# Patient Record
Sex: MALE | Race: WHITE | NOT HISPANIC OR LATINO | Employment: FULL TIME | URBAN - METROPOLITAN AREA
[De-identification: names, ages, dates, MRNs, and addresses within clinical notes are randomized per-mention and may not be internally consistent; named-entity substitution may affect disease eponyms.]

---

## 2018-01-16 ASSESSMENT — ENCOUNTER SYMPTOMS: SLEEP DISTURBANCE: 0

## 2018-01-19 ENCOUNTER — SLEEP CENTER VISIT (OUTPATIENT)
Dept: SLEEP MEDICINE | Facility: MEDICAL CENTER | Age: 43
End: 2018-01-19
Payer: COMMERCIAL

## 2018-01-19 VITALS
RESPIRATION RATE: 16 BRPM | OXYGEN SATURATION: 93 % | DIASTOLIC BLOOD PRESSURE: 78 MMHG | WEIGHT: 315 LBS | HEIGHT: 75 IN | HEART RATE: 71 BPM | BODY MASS INDEX: 39.17 KG/M2 | SYSTOLIC BLOOD PRESSURE: 140 MMHG

## 2018-01-19 DIAGNOSIS — G47.33 OSA (OBSTRUCTIVE SLEEP APNEA): ICD-10-CM

## 2018-01-19 PROCEDURE — 99203 OFFICE O/P NEW LOW 30 MIN: CPT | Performed by: INTERNAL MEDICINE

## 2018-01-19 NOTE — PROGRESS NOTES
"Chief Complaint   Patient presents with   • New Patient     Sleep consult       HPI: This patient is a 42 y.o. Male who is self-referred for sleep apnea evaluation. He has \"always snored and been tired\", and had a screening home test many years ago which allegedly showed sleep apnea however did not pursue treatment at that time. He states that he is a mouth breather, and has always had difficulty breathing through his nose. In terms of sleep habits, he goes to bed by 9:30 PM, falling asleep within 20 minutes. He himself is unaware of snoring or apneas although these are witnessed by his wife. He generally sleeps through the night. He gets up at 5 AM, feeling tired. He will typically nap in the day. He drinks 3-4 cups of coffee daily, denies tobacco, alcohol or recreational drug use. He is on no medications. His BMI is 44.       Past Medical History:   Diagnosis Date   • Back pain    • Chickenpox    • Kidney stone    • Obesity    • Pain     knee       Social History     Social History   • Marital status:      Spouse name: N/A   • Number of children: N/A   • Years of education: N/A     Occupational History   • Not on file.     Social History Main Topics   • Smoking status: Never Smoker   • Smokeless tobacco: Never Used   • Alcohol use No   • Drug use: No   • Sexual activity: Not on file     Other Topics Concern   • Not on file     Social History Narrative   • No narrative on file       Family History   Problem Relation Age of Onset   • Cancer Mother    • Alzheimer's Disease Maternal Grandmother    • Alzheimer's Disease Maternal Grandfather    • Sleep Apnea Neg Hx        No current outpatient prescriptions on file prior to visit.     No current facility-administered medications on file prior to visit.        Allergies: Patient has no known allergies.    ROS:   Constitutional: Denies fevers, chills, night sweats, fatigue or weight loss  Eyes: Denies vision loss, pain, drainage, double vision  Ears, Nose, Throat: " "Denies earache, difficulty hearing, tinnitus, +nasal congestion, denies hoarseness  Cardiovascular: Denies chest pain, tightness, palpitations, orthopnea or edema  Respiratory: Denies shortness of breath, cough, wheezing, hemoptysis  Sleep: + daytime sleepiness, snoring, apneas, denies insomnia, morning headaches  GI: Denies heartburn, dysphagia, nausea, abdominal pain, diarrhea or constipation  : Denies frequent urination, hematuria, discharge or painful urination  Musculoskeletal: Denies back pain, painful joints, sore muscles  Neurological: Denies weakness or headaches  Skin: No rashes    Blood pressure 140/78, pulse 71, resp. rate 16, height 1.905 m (6' 3\"), weight (!) 162.4 kg (358 lb), SpO2 93 %.    Physical Exam:  Appearance: Well-nourished, well-developed, in no acute distress  HEENT: Normocephalic, atraumatic, white sclera, PERRLA, oropharynx clear, Mallampati 3, 1+ tonsillar hypertrophy  Neck: No adenopathy or masses  Respiratory: no intercostal retractions or accessory muscle use  Lungs auscultation: Clear to auscultation bilaterally  Cardiovascular: Regular rate rhythm. No murmurs, rubs or gallops.  No LE edema  Abdomen: soft, nondistended  Gait: Normal  Digits: No clubbing, cyanosis  Motor: No focal deficits  Orientation: Oriented to time, person and place    Diagnosis:  1. ROYER (obstructive sleep apnea)  OVERNIGHT HOME SLEEP STUDY   2. BMI 40.0-44.9, adult (CMS-[facility])         Plan:  The patient has loud snoring, witnessed apneas, medically significant excessive weight and a crowded airway, with high clinical suspicion for obstructive sleep apnea syndrome. He has associated daytime hypersomnolence. We discussed the pathophysiology of sleep apnea as well as treatment options. He is otherwise in good health and denies any medical comorbidities or medications.  We will arrange for home polysomnography for assessment.  Return for after sleep study.      Answers for HPI/ROS submitted by the patient on " "1/16/2018   Year of your last physical exam: n/a  Results of exam: n/a  Occupation :   Height: 6' 3\"  Current weight: 350  6 months ago: 350  At age 20: 220  What is the reason for your visit today?: To test wheter I have sleep apnea  Name of person referring you to the Sleep Center: Vicente Yanez  Have you ever been hospitalized?: Yes  Reason, year, and hospital in which you were hospitalized:: Toe Surgery  Have you ever had problems with anesthesia?: No  Have you experienced post-operative delirium?: No  Any complications with surgery?: No  What year did you receive your last Flu shot?: 2017  Have you had Allergy skin testing? If so, please list the year and result:: no  Please briefly describe your sleep problem and how old you were when it began.: I've always snored and been tired in the mornings.  I have always been a mouth breather as well.  How does this affect your daily life and activities? Please also rate how serious of a problem this is (1 = Not at all, 10 = Very Serious).: 4  Have you had any previous evaluations, examinations, or treatment for this sleep problem or any other problems with your sleep? If so, please describe the evaluation, treatment, and results.: no  Have you used any medications (prescribed or otherwise) to help your sleep problem? If yes, include name, amount, frequency, and the prescribing physician.: no  If employed, what time do you usually start and end work?: 6 am  Do you ever change work shifts? If yes, describe how often (never, infrequently, regularly).: Not any longer, used to work shift work.  What time do you usually go to bed and wake up on: Weekdays? Weekends?: Go to bed 9:30pm; wake up 5:00am  Do you have a regular bed partner?: Yes  How many minutes does it usually take to fall asleep at night after turning off the lights?: 20 minutes  What do you ordinarily do just prior to turning out the lights and attempting to go to sleep (e.g., reading, TV, baths, " etc.)?: Watch TV  On average, how many times do you wake up during the night?: Depends, sometimes several times, sometimes never.  On average, how many times do you wake up to use the bathroom?: Usually never  Do you often wake up too early in the morning and are unable to return to sleep?: Occasionally  On average, how many hours of sleep do you get per night?: 8 hrs.  How do you usually awaken?  Alarm, spontaneously, or other?: Alarm  Is it difficult for you to awaken and get out of bed after sleeping? (Not at all, Sometimes, Very): Yes, most of the time.  Do you nap or return to bed after arising?: No, but will nap during the day usually.  Are you bothered by sleepiness during the day?: Yes, always tired throughout the day, usually take nap afternoon and evening when I get home.  Do you feel that you get too much sleep at night?: No  Do you feel that you get too little sleep at night?: No  Do you usually feel tired during the day? If so, what do you attribute this to?: I love naps, I usually feel very tired during the day.  I don't think I'm getting a restful night of sleep, I believe I have sleep apnea.  Do you find yourself falling asleep when you don't mean to? : Sometimes, only when I am not active, such as watching TV.  If yes, how long does your sleep episode last?: I think throughout the night, I just don't feel rested afterwards.  Do you feel rested or refreshed after the sleep episode?: No  Have you ever suddenly fallen?: No  Have you ever experienced sudden body weakness?: No  Have you ever experienced weakness or paralysis upon going to sleep?: No  Have you ever experienced weakness or paralysis upon awakening from sleep?: No, have experienced numbness in extremity such as arm.  If yes for either of the above two questions, please indicate how many times per week does this occur?: Not very often at all  Have you ever experienced seeing things or hearing voices/noises: That weren't real? On going to  "sleep? During the night? On awakening from sleep? During the day?: No  Do you have difficulty breathing at night? If yes, briefly describe.: Yes, I was getting T-Shots for a short time and I experienced breathing issues.  How many times per week?: Every night when on T-Shots.  Doesn't occur now.  How did you become aware of this, at what age did this first occur, and how many years has this occurred?: Last year was when I was on T-Shots for 3 months.  Have you been told you snore while asleep? If so, does it disturb a bed partner (or someone in the same room), or someone in the next room?: Very Badly, and yes it disturbs my wife.  Have you ever experienced doing something without being aware of the action? If yes, please describe.: No  How many times per week does this occur?: Never  Have you ever experienced upon lying in bed before sleep or on awakening from sleep: Restlessness of legs, \"nervous legs\", \"creeping crawling\" sensation of legs, or twitching of legs?: No  How many times per week does this occur, and how many minutes does the sensation last?: 0  Does anything relieve the sensations (e.g., getting out of bed, medication, massage)?: No  Have you ever been told that your arms or legs jerk or twitch while you are asleep? If yes, how many times per night does this occur?: I roll around quite a bit according to my wife.  At what age did this first occur, and how many years has this occurred?: I believe I have always done this.  Does this seem to awaken you from your sleep?: No  Do you know, or have you ever been told that you do any of the following while sleeping: talk, walk, grit teeth, wet the bed, wake up screaming or seemingly afraid, have disturbing dreams, have unusual movements, wake up with headaches, (males) have erections? If yes to any of these, please indicate how many times per week, age started, last occurrence, treatment received.: grit teeth, headaches (sometimes), talking in my sleep.  Has " anyone in your family been known to have any sleep problems? If yes, please list the type of problem (e.g., trouble getting to sleep, too sleepy, bed wetting, etc.), the relationship of this person to you, and the treatment received.: Many snore and feel lethargic in morning, no one has been treated or went in to test for sleep apnea that I know of.

## 2018-02-06 ENCOUNTER — HOME STUDY (OUTPATIENT)
Dept: SLEEP MEDICINE | Facility: MEDICAL CENTER | Age: 43
End: 2018-02-06
Attending: INTERNAL MEDICINE
Payer: COMMERCIAL

## 2018-02-06 DIAGNOSIS — G47.33 OSA (OBSTRUCTIVE SLEEP APNEA): ICD-10-CM

## 2018-02-06 PROCEDURE — 95806 SLEEP STUDY UNATT&RESP EFFT: CPT | Performed by: INTERNAL MEDICINE

## 2018-02-07 ENCOUNTER — TELEPHONE (OUTPATIENT)
Dept: SLEEP MEDICINE | Facility: MEDICAL CENTER | Age: 43
End: 2018-02-07

## 2018-02-07 DIAGNOSIS — G47.33 OSA (OBSTRUCTIVE SLEEP APNEA): ICD-10-CM

## 2018-02-07 NOTE — PROCEDURES
Interpretation:    This home sleep study was performed on 2/6/2018 using an apnea link air type III portable device. The recording duration was 7 hours and 11 minutes, the monitoring time for flow was 6 hours and 58 minutes, and the oxygen saturation evaluation duration was 6 hours and 54 minutes.    Moderate obstructive sleep apnea hypopnea was found. The respiratory event index was 23.0. Most of the events were hypopneas. The patient's central index was 0.1. The patient experienced 157 desaturations and the oxygen desaturation index was 22.7. The oxygen saturation was less than or equal to 90% for 99% of the recording. The patient spent 5 hours and 50 minutes with saturations less than or equal to 88%. The heart rate varied between a minimum of 54 bpm and a maximum of 218 bpm. The patient slept primarily in the supine position. Snoring was noted throughout the study.    Recommendation:    Recommend a positive airway pressure titration.

## 2018-02-08 NOTE — TELEPHONE ENCOUNTER
Tried to contact the patient no answer. Left voicemail for the patient to call our office to give him LUCY Moses message.

## 2018-02-08 NOTE — TELEPHONE ENCOUNTER
Please let patient know he has moderate sleep apnea and in lab titration study is recommended. If he is amenable to this, will order sleep study and hopefully have this completed prior to his next office visit. Otherwise he may wait to discuss results further at next appt.

## 2018-02-09 NOTE — TELEPHONE ENCOUNTER
Patient called back and I provided him the message from LUCY Moses. He would like to do the in lab titration study and if he can do a Friday or Saturday test that would be great so he does not miss work.

## 2018-02-12 NOTE — TELEPHONE ENCOUNTER
I called the patient to let him know that the in lab study was ordered by LUCY Lugo. He was transferred to scheduling to get that in lab study completed.

## 2018-03-24 ENCOUNTER — SLEEP STUDY (OUTPATIENT)
Dept: SLEEP MEDICINE | Facility: MEDICAL CENTER | Age: 43
End: 2018-03-24
Payer: COMMERCIAL

## 2018-03-24 DIAGNOSIS — G47.33 OSA (OBSTRUCTIVE SLEEP APNEA): ICD-10-CM

## 2018-03-24 PROCEDURE — 95811 POLYSOM 6/>YRS CPAP 4/> PARM: CPT | Performed by: FAMILY MEDICINE

## 2018-03-29 NOTE — PROCEDURES
Comments:  The patient underwent a diagnostic polysomnogram using the standard montage for measurement of parameters of sleep, respiratory events, movement abnormalities, and heart rate and rhythm.   A microphone was used to monitor snoring.  Interpretation:  Study start time was 09:26:42 PM. Diagnostic recording time was 7h 47.5m with a total sleep time of 6h 37.5m resulting in a sleep efficiency of 85.03%%.   Sleep latency from the start of the study was 13 minutes and the latency from sleep to REM was 144 minutes.  In total,55 arousals were scored for an arousal index of 8.3.  Respiratory:  There were a total of 2 apneas consisting of 0 obstructive apneas, 0 mixed apneas, and 2 central apneas. A total of 73 hypopneas were scored.  The apnea index was 0.30 per hour and the hypopnea index was 11.02 per hour resulting in an overall AHI of 11.32.  AHI during rem was 4.38 and AHI while supine was 15.08.  Oximetry:  There was a mean oxygen saturation of 91.0% with a minimum oxygen saturation of 83.0%. Time spent with oxygen saturations below 89% was 23.0 minutes.  Cardiac:  The highest heart rate seen while awake was 86 BPM while the highest heart rate during sleep was 74 BPM with an average sleeping heart rate of 53 BPM.  Limb Movements:  There were a total of 0 PLMs during sleep, of which 0 were PLMS arousals. This resulted in a PLMS index of 0.0 and a PLMS arousal index of 0.0.    Technical summary: The patient underwent a CPAP titration.  This was a 16 channel montage study to include a 6 channel EEG, a 2 channel EOG, and chin EMG, left and right leg EMG, a snore channel, and a CFLOW pressure transducer.   Respiratory effort was assessed with the use of a thoracic and abdominal monitor and overnight oximetry was obtained. Audio and video recordings were reviewed. This was a fully attended study and sleep stage scoring was performed. The test was technically adequate.    General sleep summary:  During the overnight  study, the sleep latency was 13 min which is normal. The REM latency was 142 min which is  Increased. The total sleep time was 397 min and sleep efficiency was 85 % which is normal.  Sleep stage proportions showed increased N3 sleep and increased WASO of 70 min.  In regards to sleep quality there was a mild degree of sleep fragmentation as shown by the arousal index of 8 an hour. The arousals were due to spontaneous arousal .    CPAP Titration:  The PAP titration was initiated with CPAP 5 cm of water and the pressure which was slowly titrated up in an attempt to eliminate sleep disordered breathing and snoring. The final pressure tested during the study was CPAP 16 cm water and at this final pressure the patient was observed in the supine position  and in the REM sleep stage. The apnea hypopnea index improved to 6 per hour and O2 [place] 90%. The average O2 stauration was 92%. He spent 13 % of sleep time below 89% O2 saturation. Snoring was resolved. There were no significant periodic limb movements.  The patient demonstrated NSR and an average heart rate of 53 beats per minute.  There was no ventricular ectopy or tachyarrhythmias. The patient utilized medium amaraview mask with heated humidification. The CPAP was well-tolerated and there were minimal air leaks. No supplemental oxygen was required.    Impression:  1.  ROYER  2.  Successful CPAP titration      Recommendations:  I recommend CPAP 16 cm with medium amraview mask. Recommended 30 day compliance download to assess the efficacy of the recommended pressure and compliance for further outpatient monitoring and management of CPAP therapy. In some cases alternative treatment options may prove effective in resolving sleep apnea and these options include upper airway surgery, the use of a dental orthotic or weight loss and positional therapy. Clinical correlation is required. In general patients with sleep apnea are advised to avoid alcohol and sedatives and to not  operate a motor vehicle while drowsy and are at a greater risk for cardiovascular disease.

## 2018-04-23 ENCOUNTER — SLEEP CENTER VISIT (OUTPATIENT)
Dept: SLEEP MEDICINE | Facility: MEDICAL CENTER | Age: 43
End: 2018-04-23
Payer: COMMERCIAL

## 2018-04-23 VITALS
HEIGHT: 75 IN | RESPIRATION RATE: 15 BRPM | HEART RATE: 96 BPM | BODY MASS INDEX: 39.17 KG/M2 | DIASTOLIC BLOOD PRESSURE: 82 MMHG | SYSTOLIC BLOOD PRESSURE: 132 MMHG | OXYGEN SATURATION: 93 % | WEIGHT: 315 LBS

## 2018-04-23 DIAGNOSIS — G47.33 OSA (OBSTRUCTIVE SLEEP APNEA): ICD-10-CM

## 2018-04-23 DIAGNOSIS — J30.2 SEASONAL ALLERGIC RHINITIS, UNSPECIFIED CHRONICITY, UNSPECIFIED TRIGGER: ICD-10-CM

## 2018-04-23 PROCEDURE — 99213 OFFICE O/P EST LOW 20 MIN: CPT | Performed by: NURSE PRACTITIONER

## 2018-04-23 NOTE — PROGRESS NOTES
Chief Complaint   Patient presents with   • Follow-Up     SS Results        HPI:  Luc Haynes is a 42 y.o. year old male here today for follow-up on his dedicated CPAP titration study. He was self-referred for sleep apnea evaluation. He has a history of snoring and daytime fatigue for many years. His BMI is 44. He has a home sleep study 2/6/2018 which indicates evidence of moderately severe obstructive sleep apnea with an AHI of 23 with a low 02 saturation of 80%. He underwent an in lab dedicated titration study 3/24/2018 which indicates positive response to CPAP. On the final CPAP pressure of 15 his AHI was reduced to 6.4 with a low 02 of 90%. He did have REM sleep on this pressure.   He does have a history of seasonal allergic rhinitis. He uses an OTC antihistamine prn.     Past Medical History:   Diagnosis Date   • Back pain    • Chickenpox    • Kidney stone    • Obesity    • Pain     knee       Past Surgical History:   Procedure Laterality Date   • KNEE ARTHROSCOPY  4/9/2009    Performed by SYLVIA GONZALEZ at [facility]   • MEDIAL MENISCECTOMY  4/9/2009    Performed by SYLVIA GONZALEZ at [facility]   • OTHER SURGICAL PROCEDURE  1981    right great toe replacement with skin graft from left calf       Family History   Problem Relation Age of Onset   • Cancer Mother    • Alzheimer's Disease Maternal Grandmother    • Alzheimer's Disease Maternal Grandfather    • Sleep Apnea Neg Hx        Social History     Social History   • Marital status:      Spouse name: N/A   • Number of children: N/A   • Years of education: N/A     Occupational History   • Not on file.     Social History Main Topics   • Smoking status: Never Smoker   • Smokeless tobacco: Never Used   • Alcohol use No   • Drug use: No   • Sexual activity: Not on file     Other Topics Concern   • Not on file     Social History Narrative   • No narrative on file       ROS:  Constitutional: Denies fevers, chills,  "sweats, weight loss  Eyes: Denies glasses, vision loss, pain, drainage, double vision  Ears/Nose/Mouth/Throat: Denies ear ache, difficulty hearing, sore throat, persistent hoarseness, decayed teeth/toothache. Positive for rhinitis   Cardiovascular: Denies chest pain, tightness, palpitations, swelling in feet/legs, fainting, difficulty breathing when laying down  Respiratory: Denies shortness of breath, cough, sputum, wheezing, painful breathing, coughing up blood  GI: Denies heartburn, difficulty swallowing, nausea, vomiting, abdominal pain, diarrhea, constipation  : Denies frequent urination, painful urination  Integumentary: Denies rashes, lumps or color changes  MSK: Denies painful joints, sore muscles, and back pain.   Neurological: Denies frequent headaches, dizziness, weakness  Sleep: See HPI       No current outpatient prescriptions on file.     No current facility-administered medications for this visit.        No Known Allergies    Blood pressure 132/82, pulse 96, resp. rate 15, height 1.905 m (6' 3\"), weight (!) 160.6 kg (354 lb), SpO2 93 %.    PE:   Appearance: Well developed, well nourished, no acute distress  Eyes: PERRL, EOM intact, sclera white, conjunctiva moist  Ears: no lesions or deformities  Hearing: grossly intact  Nose: no lesions or deformities  Oropharynx: tongue normal, posterior pharynx without erythema or exudate  Mallampati Classification: Class 2  Neck: supple, trachea midline, no masses   Respiratory effort: no intercostal retractions or use of accessory muscles  Lung auscultation: no rales, rhonchi or wheezes  Heart auscultation: no murmur rub or gallop  Extremities: no cyanosis or edema  Abdomen: soft ,non tender, no masses  Gait and Station: normal  Digits and nails: no clubbing, cyanosis, petechiae or nodes.  Cranial nerves: grossly intact  Skin: no rashes, lesions or ulcers noted  Orientation: Oriented to time, person and place  Mood and affect: mood and affect appropriate, " normal interaction with examiner  Judgement: Intact          Assessment:  1. ROYER (obstructive sleep apnea)  DME CPAP   2. BMI 40.0-44.9, adult (CMS-[facility])  Patient identified as having weight management issue.  Appropriate orders and counseling given.   3. Seasonal allergic rhinitis, unspecified chronicity, unspecified trigger           Plan:      1) Reviewed home sleep study and recent titration study in detail. Discussed pathophysiology of sleep apnea and various treatment options in detail. He is amendable to a trial of airway pressurization. Order for CPAP at 16 CM H20. Handout provided on sleep apnea.  2) Sleep hygiene discussed.   3) Weight loss recommended.   4) Continue prn OTC antihistamine. Recommend prn OTC nasal steroid.   5) Follow up in 8 weeks with compliance card download, sooner if needed.

## 2018-08-13 ENCOUNTER — SLEEP CENTER VISIT (OUTPATIENT)
Dept: SLEEP MEDICINE | Facility: MEDICAL CENTER | Age: 43
End: 2018-08-13
Payer: COMMERCIAL

## 2018-08-13 VITALS
DIASTOLIC BLOOD PRESSURE: 66 MMHG | OXYGEN SATURATION: 90 % | SYSTOLIC BLOOD PRESSURE: 124 MMHG | HEIGHT: 75 IN | HEART RATE: 78 BPM | WEIGHT: 315 LBS | BODY MASS INDEX: 39.17 KG/M2 | RESPIRATION RATE: 18 BRPM

## 2018-08-13 DIAGNOSIS — J30.2 SEASONAL ALLERGIC RHINITIS, UNSPECIFIED TRIGGER: ICD-10-CM

## 2018-08-13 DIAGNOSIS — G47.33 OSA (OBSTRUCTIVE SLEEP APNEA): ICD-10-CM

## 2018-08-13 PROCEDURE — 99213 OFFICE O/P EST LOW 20 MIN: CPT | Performed by: NURSE PRACTITIONER

## 2018-08-13 NOTE — PROGRESS NOTES
Chief Complaint   Patient presents with   • Follow-Up     ROYER, compliance check       HPI:  Luc Haynes is a 42 y.o. year old male here today for follow-up on his obstructive sleep apnea. He had a history of snoring and daytime fatigue for many years. He had a home sleep study 2/6/2018 which indicated evidence of moderately severe obstructive sleep apnea with an AHI of 23 with a low 02 saturation of 80%. He underwent an in lab dedicated titration study 3/24/2018 which indicates positive response to CPAP. On the final CPAP pressure of 15 his AHI was reduced to 6.4 with a low 02 of 90%. He did have REM sleep on this pressure. He was started on CPAP at 16 CM H20 at his last office visit. His compliance card download today in the office indicates an AHI of 0.8 with an average use of over 6 hours at night.   He has a full face mask which is comfortable. He feels his current mask is a good fit. He tolerates the pressure well. He notes significant dry mouth in the mornings. He does breath through his mouth. He does feel he sleeps a little better on therapy and energy levels have improved. He denies any morning headaches.   He does have a history of seasonal allergic rhinitis. He uses an OTC antihistamine prn.     Past Medical History:   Diagnosis Date   • Back pain    • Chickenpox    • Kidney stone    • Obesity    • Pain     knee       Past Surgical History:   Procedure Laterality Date   • KNEE ARTHROSCOPY  4/9/2009    Performed by SYLVIA GONZALEZ at SURGERY [facility]   • MEDIAL MENISCECTOMY  4/9/2009    Performed by SYLVIA GONZALEZ at SURGERY [facility]   • OTHER SURGICAL PROCEDURE  1981    right great toe replacement with skin graft from left calf       Family History   Problem Relation Age of Onset   • Cancer Mother    • Alzheimer's Disease Maternal Grandmother    • Alzheimer's Disease Maternal Grandfather    • Sleep Apnea Neg Hx        Social History     Social History   • Marital status:  "     Spouse name: N/A   • Number of children: N/A   • Years of education: N/A     Occupational History   • Not on file.     Social History Main Topics   • Smoking status: Never Smoker   • Smokeless tobacco: Never Used   • Alcohol use No   • Drug use: No   • Sexual activity: Not on file     Other Topics Concern   • Not on file     Social History Narrative   • No narrative on file         ROS:  Constitutional: Denies fevers, chills, sweats, fatigue, weight loss  Eyes: Denies glasses, vision loss, pain, drainage, double vision  Ears/Nose/Mouth/Throat: Denies ear ache, difficulty hearing, sore throat, persistent hoarseness, decayed teeth/toothache  Cardiovascular: Denies chest pain, tightness, palpitations, swelling in feet/legs, fainting, difficulty breathing when laying down  Respiratory: Denies shortness of breath, cough, sputum, wheezing, painful breathing, coughing up blood  GI: Denies heartburn, difficulty swallowing, nausea, vomiting, abdominal pain, diarrhea, constipation  : Denies frequent urination, painful urination  Integumentary: Denies rashes, lumps or color changes  MSK: Denies painful joints, sore muscles, and back pain.   Neurological: Denies frequent headaches, dizziness, weakness  Sleep: See HPI       No current outpatient prescriptions on file.     No current facility-administered medications for this visit.        No Known Allergies    Blood pressure 124/66, pulse 78, resp. rate 18, height 1.905 m (6' 3\"), weight (!) 160.6 kg (354 lb), SpO2 90 %.    PE:   Appearance: Well developed, well nourished, no acute distress  Eyes: PERRL, EOM intact, sclera white, conjunctiva moist  Ears: no lesions or deformities  Hearing: grossly intact  Nose: no lesions or deformities  Oropharynx: tongue normal, posterior pharynx without erythema or exudate  Mallampati Classification: Class 2  Neck: supple, trachea midline, no masses   Respiratory effort: no intercostal retractions or use of accessory " muscles  Lung auscultation: no rales, rhonchi or wheezes  Heart auscultation: no murmur rub or gallop  Extremities: no cyanosis or edema  Abdomen: soft ,non tender, no masses  Gait and Station: normal  Digits and nails: no clubbing, cyanosis, petechiae or nodes.  Cranial nerves: grossly intact  Skin: no rashes, lesions or ulcers noted  Orientation: Oriented to time, person and place  Mood and affect: mood and affect appropriate, normal interaction with examiner  Judgement: Intact          Assessment:    1. ROYER (obstructive sleep apnea)  DME OTHER   2. BMI 40.0-44.9, adult (HCC)     3. Seasonal allergic rhinitis, unspecified trigger           Plan:    1) Continue CPAP @ 16 CM H20. He is using his machine nightly and benefiting from use. He has a full face mask which he feels is a good fit. He continues to have dry mouth complaints despite adjusting the humidifier. Order to DME to fit for a chin strap.   2) Sleep hygiene discussed. Recommend keeping a set sleep/wake schedule. Logging enough hours of sleep. Limiting/Avoiding naps. No caffeine after noon and no heavy meals in the evening. Recommend daily exercise.   3) Weight loss recommended.  4) Continue OTC antihistamine prn.   5) Follow up in 6 months with repeat compliance card download, sooner if needed.

## 2018-08-13 NOTE — PATIENT INSTRUCTIONS
Plan:    1) Continue CPAP @ 16 CM H20. He is using his machine nightly and benefiting from use. He has a full face mask which he feels is a good fit. He continues to have dry mouth complaints despite adjusting the humidifier. Order to DME to fit for a chin strap.   2) Sleep hygiene discussed. Recommend keeping a set sleep/wake schedule. Logging enough hours of sleep. Limiting/Avoiding naps. No caffeine after noon and no heavy meals in the evening. Recommend daily exercise.   3) Weight loss recommended.  4) Continue OTC antihistamine prn.   5) Follow up in 6 months with repeat compliance card download, sooner if needed.

## 2019-02-19 ENCOUNTER — SLEEP CENTER VISIT (OUTPATIENT)
Dept: SLEEP MEDICINE | Facility: MEDICAL CENTER | Age: 44
End: 2019-02-19
Payer: COMMERCIAL

## 2019-02-19 VITALS
SYSTOLIC BLOOD PRESSURE: 130 MMHG | WEIGHT: 315 LBS | BODY MASS INDEX: 39.17 KG/M2 | HEIGHT: 75 IN | TEMPERATURE: 97.9 F | HEART RATE: 89 BPM | OXYGEN SATURATION: 96 % | RESPIRATION RATE: 16 BRPM | DIASTOLIC BLOOD PRESSURE: 72 MMHG

## 2019-02-19 DIAGNOSIS — J30.2 SEASONAL ALLERGIC RHINITIS, UNSPECIFIED TRIGGER: ICD-10-CM

## 2019-02-19 DIAGNOSIS — G47.33 OSA (OBSTRUCTIVE SLEEP APNEA): ICD-10-CM

## 2019-02-19 PROCEDURE — 99213 OFFICE O/P EST LOW 20 MIN: CPT | Performed by: NURSE PRACTITIONER

## 2019-02-20 NOTE — PROGRESS NOTES
Chief Complaint   Patient presents with   • Apnea     Last Seen 8/13/18       HPI:  Luc Haynes is a 43 y.o. year old male here today for follow-up on his obstructive sleep apnea. He had a history of snoring and daytime fatigue for many years. He had a home sleep study 2/6/2018 which indicated evidence of moderately severe obstructive sleep apnea with an AHI of 23 with a low 02 saturation of 80%. He underwent an in lab dedicated titration study 3/24/2018 which indicates positive response to CPAP. On the final CPAP pressure of 15 his AHI was reduced to 6.4 with a low 02 of 90%. He did have REM sleep on this pressure. He was started on CPAP at 16 CM H20. Compliance card download at his last office visit indicated an AHI of 0.8 with an average use of over 6 hours at night. He did not bring his compliance chip to today's office visit. He had complaints of dry mouth despite use of a full face mask. He was ordered a chin strap and encouraged to adjust humidification up. He never received a chin strap. He has adjusted his humidification. He feels this has helped. He tolerates the pressure well. He does feel he sleeps better on therapy overall and wakes more refreshed. He denies any morning headaches.     He does have a history of seasonal allergic rhinitis. He uses an OTC antihistamine prn.       Past Medical History:   Diagnosis Date   • Back pain    • Chickenpox    • Kidney stone    • Obesity    • Pain     knee       Past Surgical History:   Procedure Laterality Date   • KNEE ARTHROSCOPY  4/9/2009    Performed by SYLVIA GONZALEZ at SURGERY [facility]   • MEDIAL MENISCECTOMY  4/9/2009    Performed by SYLVIA GONZALEZ at [facility]   • OTHER SURGICAL PROCEDURE  1981    right great toe replacement with skin graft from left calf       Family History   Problem Relation Age of Onset   • Cancer Mother    • Alzheimer's Disease Maternal Grandmother    • Alzheimer's Disease Maternal Grandfather    •  "Sleep Apnea Neg Hx        Social History     Social History   • Marital status:      Spouse name: N/A   • Number of children: N/A   • Years of education: N/A     Occupational History   • Not on file.     Social History Main Topics   • Smoking status: Never Smoker   • Smokeless tobacco: Never Used   • Alcohol use No   • Drug use: No   • Sexual activity: Not on file     Other Topics Concern   • Not on file     Social History Narrative   • No narrative on file         ROS:  Constitutional: Denies fevers, chills, sweats, fatigue, weight loss  Eyes: Denies glasses, vision loss, pain, drainage, double vision  Ears/Nose/Mouth/Throat: Denies ear ache, difficulty hearing, sore throat, persistent hoarseness, decayed teeth/toothache  Cardiovascular: Denies chest pain, tightness, palpitations, swelling in feet/legs, fainting, difficulty breathing when laying down  Respiratory: Denies shortness of breath, cough, sputum, wheezing, painful breathing, coughing up blood  GI: Denies heartburn, difficulty swallowing, nausea, vomiting, abdominal pain, diarrhea, constipation  : Denies frequent urination, painful urination  Integumentary: Denies rashes, lumps or color changes  MSK: Denies painful joints, sore muscles, and back pain.   Neurological: Denies frequent headaches, dizziness, weakness  Sleep: See HPI       No current outpatient prescriptions on file.     No current facility-administered medications for this visit.        No Known Allergies    Blood pressure 130/72, pulse 89, temperature 36.6 °C (97.9 °F), temperature source Temporal, resp. rate 16, height 1.905 m (6' 3\"), weight (!) 160.6 kg (354 lb), SpO2 96 %.    PE:   Appearance: Well developed, well nourished, no acute distress  Eyes: PERRL, EOM intact, sclera white, conjunctiva moist  Ears: no lesions or deformities  Hearing: grossly intact  Nose: no lesions or deformities  Oropharynx: tongue normal, posterior pharynx without erythema or exudate  Mallampati " Classification: Class 3   Neck: supple, trachea midline, no masses   Respiratory effort: no intercostal retractions or use of accessory muscles  Lung auscultation: no rales, rhonchi or wheezes  Heart auscultation: no murmur rub or gallop  Extremities: no cyanosis or edema  Abdomen: soft ,non tender, no masses  Gait and Station: normal  Digits and nails: no clubbing, cyanosis, petechiae or nodes.  Cranial nerves: grossly intact  Skin: no rashes, lesions or ulcers noted  Orientation: Oriented to time, person and place  Mood and affect: mood and affect appropriate, normal interaction with examiner  Judgement: Intact          Assessment:    1. ROYER (obstructive sleep apnea)     2. BMI 40.0-44.9, adult (HCC)     3. Seasonal allergic rhinitis, unspecified trigger           Plan:    1) Continue CPAP @ 16 CM H20. Discussed importance of cleaning mask and supplies routinely. He is going to look into getting a SoClean.   2) Sleep hygiene discussed. Recommend keeping a set sleep/wake schedule. Logging enough hours of sleep. Limiting/Avoiding naps. No caffeine after noon and no heavy meals in the evening. Recommend daily exercise.   3) Weight loss recommended.  4) Continue OTC antihistamine prn.   5) Recommend updated Influenza vaccination. He declines at this time as it is late in the Flu season, but states he will get one next Fall.   6) Annual sleep follow up, sooner if needed.     Compliance download received from his DME 2/19/2019 indicates a reduced AHI of 0.5 with an average use of just under 5 hours at night.

## 2019-02-20 NOTE — PATIENT INSTRUCTIONS
Plan:    1) Continue CPAP @ 16 CM H20. Discussed importance of cleaning mask and supplies routinely. He is going to look into getting a SoClean.   2) Sleep hygiene discussed. Recommend keeping a set sleep/wake schedule. Logging enough hours of sleep. Limiting/Avoiding naps. No caffeine after noon and no heavy meals in the evening. Recommend daily exercise.   3) Weight loss recommended.  4) Continue OTC antihistamine prn.   5) Recommend updated Influenza vaccination. He declines at this time as it is late in the Flu season, but states he will get one next Fall.   6) Annual sleep follow up, sooner if needed.

## 2019-04-28 ENCOUNTER — OFFICE VISIT (OUTPATIENT)
Dept: URGENT CARE | Facility: PHYSICIAN GROUP | Age: 44
End: 2019-04-28
Payer: COMMERCIAL

## 2019-04-28 VITALS
SYSTOLIC BLOOD PRESSURE: 114 MMHG | HEART RATE: 74 BPM | HEIGHT: 76 IN | BODY MASS INDEX: 38.36 KG/M2 | WEIGHT: 315 LBS | RESPIRATION RATE: 16 BRPM | OXYGEN SATURATION: 96 % | TEMPERATURE: 99.1 F | DIASTOLIC BLOOD PRESSURE: 70 MMHG

## 2019-04-28 DIAGNOSIS — H61.23 BILATERAL IMPACTED CERUMEN: ICD-10-CM

## 2019-04-28 DIAGNOSIS — R42 VERTIGO: ICD-10-CM

## 2019-04-28 DIAGNOSIS — K13.79 UVULAR SWELLING: Primary | ICD-10-CM

## 2019-04-28 PROCEDURE — 99214 OFFICE O/P EST MOD 30 MIN: CPT | Performed by: PHYSICIAN ASSISTANT

## 2019-04-28 RX ORDER — DIPHENHYDRAMINE HCL 25 MG
25 CAPSULE ORAL ONCE
Status: COMPLETED | OUTPATIENT
Start: 2019-04-28 | End: 2019-04-28

## 2019-04-28 RX ORDER — PREDNISONE 20 MG/1
20 TABLET ORAL ONCE
Status: COMPLETED | OUTPATIENT
Start: 2019-04-28 | End: 2019-04-28

## 2019-04-28 RX ORDER — PREDNISONE 20 MG/1
20 TABLET ORAL 2 TIMES DAILY
Qty: 6 TAB | Refills: 0 | Status: SHIPPED | OUTPATIENT
Start: 2019-04-28 | End: 2019-05-01

## 2019-04-28 RX ORDER — AMOXICILLIN 875 MG/1
875 TABLET, COATED ORAL 2 TIMES DAILY
Qty: 14 TAB | Refills: 0 | Status: SHIPPED | OUTPATIENT
Start: 2019-04-28 | End: 2019-05-05

## 2019-04-28 RX ORDER — PREDNISONE 20 MG/1
20 TABLET ORAL ONCE
Status: DISCONTINUED | OUTPATIENT
Start: 2019-04-28 | End: 2019-04-28

## 2019-04-28 RX ADMIN — PREDNISONE 20 MG: 20 TABLET ORAL at 16:30

## 2019-04-28 RX ADMIN — Medication 25 MG: at 16:30

## 2019-04-28 NOTE — PROGRESS NOTES
Subjective:      Luc Haynes is a 43 y.o. male who presents with Otalgia (dizziness, swollen throat x 3 days)    PMH:  has a past medical history of Back pain; Chickenpox; Kidney stone; Obesity; and Pain.  MEDS: No current outpatient prescriptions on file.  ALLERGIES: No Known Allergies  SURGHX:   Past Surgical History:   Procedure Laterality Date   • KNEE ARTHROSCOPY  4/9/2009    Performed by SYLVIA GONZALEZ at SURGERY [facility]   • MEDIAL MENISCECTOMY  4/9/2009    Performed by SYLVIA GONZALEZ at SURGERY [facility]   • OTHER SURGICAL PROCEDURE  1981    right great toe replacement with skin graft from left calf     SOCHX:  reports that he has never smoked. He has never used smokeless tobacco. He reports that he does not drink alcohol or use drugs.  FH: Reviewed with patient, not pertinent to this visit.           Patient presents with:  Otalgia: dizziness, swollen throat x 3 days.  Pt was camping and has had a flare up of his allergies. Pt states he has also noticed some fullness in his ears, right more than left and has been getting a little dizzy when lying down and sitting up.  Pt denies dizziness otherwise.  Pt has been taking some benadryl with improvement of his swollen throat but not the dizziness.           Pharyngitis    This is a new problem. The current episode started in the past 7 days. The problem has been unchanged. Neither side of throat is experiencing more pain than the other. There has been no fever. The pain is at a severity of 5/10. Associated symptoms include congestion, a plugged ear sensation, swollen glands and trouble swallowing. Pertinent negatives include no ear discharge, headaches or stridor. He has tried cool liquids and NSAIDs (benadryl) for the symptoms. The treatment provided moderate relief.       Review of Systems   Constitutional: Negative for fever.   HENT: Positive for congestion, sore throat and trouble swallowing. Negative for ear discharge.     Respiratory: Negative for stridor.    Neurological: Positive for dizziness. Negative for headaches.   Endo/Heme/Allergies: Positive for environmental allergies.   All other systems reviewed and are negative.         Objective:     There were no vitals taken for this visit.     Physical Exam   Constitutional: He is oriented to person, place, and time. He appears well-developed and well-nourished. No distress.   HENT:   Head: Normocephalic and atraumatic.   Right Ear: Decreased hearing is noted.   Left Ear: Decreased hearing is noted.   Nose: Rhinorrhea present.   Mouth/Throat: Uvula is midline and mucous membranes are normal. Uvula swelling (mild) present. Posterior oropharyngeal erythema present. No tonsillar abscesses. Tonsils are 2+ on the right. Tonsils are 2+ on the left. No tonsillar exudate.   Cerumen impaction.    Eyes: Pupils are equal, round, and reactive to light. Conjunctivae and EOM are normal.   Neck: Normal range of motion. Neck supple. No JVD present.   Cardiovascular: Normal rate, regular rhythm and normal heart sounds.    Pulmonary/Chest: Effort normal and breath sounds normal.   Abdominal: Soft.   Musculoskeletal: Normal range of motion.   Lymphadenopathy:     He has no cervical adenopathy.   Neurological: He is alert and oriented to person, place, and time. He has normal strength. No cranial nerve deficit or sensory deficit. He displays a negative Romberg sign. GCS eye subscore is 4. GCS verbal subscore is 5. GCS motor subscore is 6.   + panchito-hallpike   Skin: Skin is warm and dry. Capillary refill takes less than 2 seconds.   Psychiatric: He has a normal mood and affect.   Nursing note and vitals reviewed.         Procedure: Cerumen Removal  Risks and benefits of procedure discussed  Cerumen removed with curette and lavage after softening agent instilled  Patient tolerated well  Post procedure exam with clear canal and normal TM       Assessment/Plan:     1. Uvular swelling  diphenhydrAMINE  (BENADRYL) capsule 25 mg    predniSONE (DELTASONE) 20 MG Tab    amoxicillin (AMOXIL) 875 MG tablet    predniSONE (DELTASONE) tablet 20 mg        2. Bilateral impacted cerumen  predniSONE (DELTASONE) 20 MG Tab    amoxicillin (AMOXIL) 875 MG tablet   3. Vertigo  predniSONE (DELTASONE) 20 MG Tab    amoxicillin (AMOXIL) 875 MG tablet     PT was instructed to begin a daily OTC allergy medication for relief of allergy symptoms.  Ex: allegra, claritin, zyrtec, allerclear, etc.   Pt can also take OTC benadryl at bedtime if symptoms are keeping them awake at night.     PT advised saltwater gargles/swishes  3-4 times daily until symptoms improve.     PT should follow up with PCP in 1-2 days for re-evaluation if symptoms have not improved.  Discussed red flags and reasons to return to UC or ED.  Pt and/or family verbalized understanding of diagnosis and follow up instructions and was offered informational handout on diagnosis.  PT discharged.

## 2019-04-28 NOTE — PATIENT INSTRUCTIONS
Vertigo  Vertigo is the feeling that you or your surroundings are moving when they are not. Vertigo can be dangerous if it occurs while you are doing something that could endanger you or others, such as driving.  What are the causes?  This condition is caused by a disturbance in the signals that are sent by your body’s sensory systems to your brain. Different causes of a disturbance can lead to vertigo, including:  · Infections, especially in the inner ear.  · A bad reaction to a drug, or misuse of alcohol and medicines.  · Withdrawal from drugs or alcohol.  · Quickly changing positions, as when lying down or rolling over in bed.  · Migraine headaches.  · Decreased blood flow to the brain.  · Decreased blood pressure.  · Increased pressure in the brain from a head or neck injury, stroke, infection, tumor, or bleeding.  · Central nervous system disorders.  What are the signs or symptoms?  Symptoms of this condition usually occur when you move your head or your eyes in different directions. Symptoms may start suddenly, and they usually last for less than a minute. Symptoms may include:  · Loss of balance and falling.  · Feeling like you are spinning or moving.  · Feeling like your surroundings are spinning or moving.  · Nausea and vomiting.  · Blurred vision or double vision.  · Difficulty hearing.  · Slurred speech.  · Dizziness.  · Involuntary eye movement (nystagmus).  Symptoms can be mild and cause only slight annoyance, or they can be severe and interfere with daily life. Episodes of vertigo may return (recur) over time, and they are often triggered by certain movements. Symptoms may improve over time.  How is this diagnosed?  This condition may be diagnosed based on medical history and the quality of your nystagmus. Your health care provider may test your eye movements by asking you to quickly change positions to trigger the nystagmus. This may be called the Tu-Hallpike test, head thrust test, or roll test. You  may be referred to a health care provider who specializes in ear, nose, and throat (ENT) problems (otolaryngologist) or a provider who specializes in disorders of the central nervous system (neurologist).  You may have additional testing, including:  · A physical exam.  · Blood tests.  · MRI.  · A CT scan.  · An electrocardiogram (ECG). This records electrical activity in your heart.  · An electroencephalogram (EEG). This records electrical activity in your brain.  · Hearing tests.  How is this treated?  Treatment for this condition depends on the cause and the severity of the symptoms. Treatment options include:  · Medicines to treat nausea or vertigo. These are usually used for severe cases. Some medicines that are used to treat other conditions may also reduce or eliminate vertigo symptoms. These include:  ¨ Medicines that control allergies (antihistamines).  ¨ Medicines that control seizures (anticonvulsants).  ¨ Medicines that relieve depression (antidepressants).  ¨ Medicines that relieve anxiety (sedatives).  · Head movements to adjust your inner ear back to normal. If your vertigo is caused by an ear problem, your health care provider may recommend certain movements to correct the problem.  · Surgery. This is rare.  Follow these instructions at home:  Safety  · Move slowly.Avoid sudden body or head movements.  · Avoid driving.  · Avoid operating heavy machinery.  · Avoid doing any tasks that would cause danger to you or others if you would have a vertigo episode during the task.  · If you have trouble walking or keeping your balance, try using a cane for stability. If you feel dizzy or unstable, sit down right away.  · Return to your normal activities as told by your health care provider. Ask your health care provider what activities are safe for you.  General instructions  · Take over-the-counter and prescription medicines only as told by your health care provider.  · Avoid certain positions or movements as  told by your health care provider.  · Drink enough fluid to keep your urine clear or pale yellow.  · Keep all follow-up visits as told by your health care provider. This is important.  Contact a health care provider if:  · Your medicines do not relieve your vertigo or they make it worse.  · You have a fever.  · Your condition gets worse or you develop new symptoms.  · Your family or friends notice any behavioral changes.  · Your nausea or vomiting gets worse.  · You have numbness or a “pins and needles” sensation in part of your body.  Get help right away if:  · You have difficulty moving or speaking.  · You are always dizzy.  · You faint.  · You develop severe headaches.  · You have weakness in your hands, arms, or legs.  · You have changes in your hearing or vision.  · You develop a stiff neck.  · You develop sensitivity to light.  This information is not intended to replace advice given to you by your health care provider. Make sure you discuss any questions you have with your health care provider.  Document Released: 09/27/2006 Document Revised: 05/31/2017 Document Reviewed: 04/11/2016  [email or web address] Interactive Patient Education © 2017 [employer]

## 2019-04-29 ASSESSMENT — ENCOUNTER SYMPTOMS
FEVER: 0
HEADACHES: 0
DIZZINESS: 1
SWOLLEN GLANDS: 1
SORE THROAT: 1
TROUBLE SWALLOWING: 1
STRIDOR: 0

## 2019-11-17 ENCOUNTER — OFFICE VISIT (OUTPATIENT)
Dept: URGENT CARE | Facility: CLINIC | Age: 44
End: 2019-11-17
Payer: COMMERCIAL

## 2019-11-17 VITALS
HEIGHT: 75 IN | WEIGHT: 315 LBS | TEMPERATURE: 98.3 F | BODY MASS INDEX: 39.17 KG/M2 | HEART RATE: 58 BPM | RESPIRATION RATE: 16 BRPM | OXYGEN SATURATION: 97 % | DIASTOLIC BLOOD PRESSURE: 74 MMHG | SYSTOLIC BLOOD PRESSURE: 126 MMHG

## 2019-11-17 DIAGNOSIS — H61.23 BILATERAL HEARING LOSS DUE TO CERUMEN IMPACTION: ICD-10-CM

## 2019-11-17 PROCEDURE — 69210 REMOVE IMPACTED EAR WAX UNI: CPT | Performed by: NURSE PRACTITIONER

## 2019-11-17 RX ORDER — BECLOMETHASONE DIPROPIONATE HFA 80 UG/1
AEROSOL, METERED RESPIRATORY (INHALATION)
Refills: 8 | COMMUNITY
Start: 2019-10-19 | End: 2022-06-17 | Stop reason: SDUPTHER

## 2019-11-18 NOTE — PROGRESS NOTES
Chief Complaint   Patient presents with   • Otalgia     RT ear pain LT ear plugged x2 wks       HISTORY OF PRESENT ILLNESS: Patient is a 44 y.o. male who presents to urgent care today with complaints of bilateral hearing loss and ear pressure.  Notes for the past 2 weeks he has had symptoms.  He has started to notice some mild pain to his right ear as well.  He denies any fever, chills, malaise.  He does have nasal congestion, but notes this is normal for him.  Denies any sinus tenderness.  He has not tried anything for symptom relief.    Patient Active Problem List    Diagnosis Date Noted   • ROYER (obstructive sleep apnea) 08/13/2018       Allergies:Patient has no known allergies.    Current Outpatient Medications Ordered in Epic   Medication Sig Dispense Refill   • QVAR REDIHALER 80 MCG/ACT inhaler INHALE 1 PUFF IN EACH NOSTRIL DAILY.  USE WITH BABY NIPPLE AS DIRECTED.  MAY USE UP TO BID  8     No current [facility]-ordered facility-administered medications on file.        Past Medical History:   Diagnosis Date   • Back pain    • Chickenpox    • Kidney stone    • Obesity    • Pain     knee       Social History     Tobacco Use   • Smoking status: Never Smoker   • Smokeless tobacco: Never Used   Substance Use Topics   • Alcohol use: No   • Drug use: No       Family Status   Relation Name Status   • Mo  (Not Specified)   • MGMo  (Not Specified)   • MGFa  (Not Specified)   • Neg Hx  (Not Specified)     Family History   Problem Relation Age of Onset   • Cancer Mother    • Alzheimer's Disease Maternal Grandmother    • Alzheimer's Disease Maternal Grandfather    • Sleep Apnea Neg Hx        ROS:  Review of Systems   Constitutional: Negative for fever, chills, weight loss, malaise, and fatigue.   HENT: Positive for ear pain and muffled hearing.  Positive for rhinitis.  Negative for nosebleeds, congestion, sinus pain, sore throat and neck pain.    Eyes: Negative for vision changes.   Neuro: Negative for headache, sensory changes,  "weakness, seizure, LOC.   Cardiovascular: Negative for chest pain, palpitations, orthopnea and leg swelling.   Respiratory: Negative for cough, sputum production, shortness of breath and wheezing.   Gastrointestinal: Negative for abdominal pain, nausea, vomiting or diarrhea.   Genitourinary: Negative for dysuria, urgency and frequency.  Musculoskeletal: Negative for falls, neck pain, back pain, joint pain, myalgias.   Skin: Negative for rash, diaphoresis.     Exam:  /74 (BP Location: Right arm, Patient Position: Sitting, BP Cuff Size: Large adult)   Pulse (!) 58   Temp 36.8 °C (98.3 °F)   Resp 16   Ht 1.905 m (6' 3\")   Wt (!) 161.1 kg (355 lb 3.2 oz)   SpO2 97%   General: well-nourished, well-developed male in NAD  Head: normocephalic, atraumatic  Eyes: PERRLA, no conjunctival injection, acuity grossly intact, lids normal.  Ears: normal shape and symmetry, no tenderness, no discharge. External canals are without any significant edema or erythema.  Unable to visualize tympanic membranes due to bilateral cerumen impaction.  Gross auditory acuity is intact.  Nose: symmetrical without tenderness, no discharge.  Mouth/Throat: reasonable hygiene, no erythema, exudates or tonsillar enlargement.  Neck: no masses, range of motion within normal limits, no tracheal deviation. No obvious thyroid enlargement.   Lymph: no cervical adenopathy. No supraclavicular adenopathy.   Neuro: alert and oriented. Cranial nerves 1-12 grossly intact. No sensory deficit.   Cardiovascular: regular rate and rhythm. No edema.  Pulmonary: no distress. Chest is symmetrical with respiration, no wheezes, crackles, or rhonchi.   Musculoskeletal: no clubbing, appropriate muscle tone, gait is stable.  Skin: warm, dry, intact, no clubbing, no cyanosis, no rashes.   Psych: appropriate mood, affect, judgement.         Assessment/Plan:  1. Bilateral hearing loss due to cerumen impaction         Procedure: Cerumen Removal  Risks and benefits of " procedure discussed  Cerumen removed with curette and lavage after softening agent instilled  Patient tolerated well  Post procedure exam with clear canals and normal TMs      Suspect symptoms secondary from cerumen impaction.  Patient reports significant improvement post removal.  Keep ears clean and dry.  Supportive care, differential diagnoses, and indications for immediate follow-up discussed with patient.   Pathogenesis of diagnosis discussed including typical length and natural progression.   Instructed to return to clinic or nearest emergency department for any change in condition, further concerns, or worsening of symptoms.  Patient states understanding of the plan of care and discharge instructions.  Instructed to make an appointment, for follow up, with his primary care provider.        Please note that this dictation was created using voice recognition software. I have made every reasonable attempt to correct obvious errors, but I expect that there are errors of grammar and possibly content that I did not discover before finalizing the note.      WARREN Avalos.

## 2022-06-10 ENCOUNTER — TELEPHONE (OUTPATIENT)
Dept: SCHEDULING | Facility: IMAGING CENTER | Age: 47
End: 2022-06-10
Payer: COMMERCIAL

## 2022-06-17 ENCOUNTER — OFFICE VISIT (OUTPATIENT)
Dept: MEDICAL GROUP | Facility: PHYSICIAN GROUP | Age: 47
End: 2022-06-17
Payer: COMMERCIAL

## 2022-06-17 VITALS
WEIGHT: 315 LBS | DIASTOLIC BLOOD PRESSURE: 88 MMHG | OXYGEN SATURATION: 93 % | HEART RATE: 99 BPM | TEMPERATURE: 98.1 F | SYSTOLIC BLOOD PRESSURE: 124 MMHG | HEIGHT: 75 IN | BODY MASS INDEX: 39.17 KG/M2

## 2022-06-17 DIAGNOSIS — R73.01 ELEVATED FASTING GLUCOSE: ICD-10-CM

## 2022-06-17 DIAGNOSIS — E11.9 TYPE 2 DIABETES MELLITUS WITHOUT COMPLICATION, WITHOUT LONG-TERM CURRENT USE OF INSULIN (HCC): ICD-10-CM

## 2022-06-17 DIAGNOSIS — G47.33 OSA (OBSTRUCTIVE SLEEP APNEA): ICD-10-CM

## 2022-06-17 LAB
HBA1C MFR BLD: 12.5 % (ref 0–5.6)
INT CON NEG: ABNORMAL
INT CON POS: ABNORMAL

## 2022-06-17 PROCEDURE — 99204 OFFICE O/P NEW MOD 45 MIN: CPT

## 2022-06-17 PROCEDURE — 83036 HEMOGLOBIN GLYCOSYLATED A1C: CPT

## 2022-06-17 RX ORDER — METFORMIN HYDROCHLORIDE 500 MG/1
500 TABLET, EXTENDED RELEASE ORAL DAILY
Qty: 60 TABLET | Refills: 3 | Status: SHIPPED | OUTPATIENT
Start: 2022-06-17 | End: 2022-06-22

## 2022-06-17 RX ORDER — BECLOMETHASONE DIPROPIONATE HFA 80 UG/1
AEROSOL, METERED RESPIRATORY (INHALATION)
Qty: 10.6 G | Refills: 8 | Status: SHIPPED | OUTPATIENT
Start: 2022-06-17 | End: 2023-06-19 | Stop reason: SDUPTHER

## 2022-06-17 ASSESSMENT — ENCOUNTER SYMPTOMS
CONSTIPATION: 0
DIARRHEA: 0
BLURRED VISION: 0
DOUBLE VISION: 0
SENSORY CHANGE: 0
SHORTNESS OF BREATH: 0
FEVER: 0
CHILLS: 0
WEIGHT LOSS: 0
ABDOMINAL PAIN: 0
VOMITING: 0
WEAKNESS: 0
NAUSEA: 0

## 2022-06-17 ASSESSMENT — PATIENT HEALTH QUESTIONNAIRE - PHQ9: CLINICAL INTERPRETATION OF PHQ2 SCORE: 0

## 2022-06-17 NOTE — ASSESSMENT & PLAN NOTE
Chronic condition currently active  - Continue to use CPAP as directed  - Qvar 80 mcg per attenuation 1 puff each nostril daily as directed   Skin normal color for race, warm, dry and intact. No evidence of rash.

## 2022-06-17 NOTE — ASSESSMENT & PLAN NOTE
This is a new condition of uncertain prognosis  - POCT hemoglobin A1c drawn in clinic results are as follows:12.5%  -Recommend healthy diet such as Mediterranean  - Recommend dedicated daily exercise of at least 30 minutes/day most days the week for total of 150 minutes weekly  - Labs ordered:

## 2022-06-17 NOTE — ASSESSMENT & PLAN NOTE
Chronic condition currently active  - Recommend referral to nutritionist  - Recommend referral to diabetic educator  - Recommend dedicated exercise of at least 30 minutes most days of the week 150 minutes total

## 2022-06-17 NOTE — PROGRESS NOTES
Subjective:     CC:  Diagnoses of Elevated fasting glucose, BMI 40.0-44.9, adult ([facility]), MORRIS (obstructive sleep apnea), and Type 2 diabetes mellitus without complication, without long-term current use of insulin ([facility]) were pertinent to this visit.    HISTORY OF THE PRESENT ILLNESS: Patient is a 46 y.o. male. This pleasant patient is here today to establish care and discuss the following problems:    Problem   Elevated Fasting Glucose    This is a new condition of uncertain prognosis  - Patient had work physical done and showed fasting blood sugar was 283.  He reports that he has been having polyuria, polydipsia, and polyphagia, denies blurred vision.  He does have numbness and tingling in his arms at times but does not endorse any peripheral neuropathy in his legs or feet.  -He does not endorse a healthy diet or have an established daily exercise routine  - He reports his grandmother had diabetes     Bmi 40.0-44.9, Adult ([facility])    This is a activechronic condition.   Max weight: 368   Current weight: 344 (20 lb loss in the past year)  BMI: 43.00  Diet: poor   Exercise: none  Goal Weight: [id number]       Type 2 Diabetes Mellitus Without Complication, Without Long-Term Current Use of Insulin ([facility])    This is a newly diagnosed condition that is likely been evolving over the last several months.  Patient had a work related health event and was told his fasting blood sugar was 283.  He has been having polyuria, polydipsia, polyphagia for several months.  He has a family member (grandmother) with this condition.  Has not tried any treatments.     Morris (Obstructive Sleep Apnea)    Chronic condition currently active he uses CPAP for the last 3 to 4 years.  He is doing well with this device no reported side effects.  He also takes Qvar ready inhaler at times for this condition and finds this very helpful.  Would like refill on this medication.         Health Maintenance: We will bring back for dedicated health maintenance  "visit    ROS:   Review of Systems   Constitutional: Positive for malaise/fatigue. Negative for chills, fever and weight loss.   Eyes: Negative for blurred vision and double vision.   Respiratory: Negative for shortness of breath.    Cardiovascular: Negative for chest pain.   Gastrointestinal: Negative for abdominal pain, constipation, diarrhea, nausea and vomiting.   Genitourinary: Positive for frequency.        Polyuria and polydipsia   Neurological: Negative for sensory change and weakness.         Objective:     Exam: /88 (BP Location: Left arm, Patient Position: Sitting, BP Cuff Size: Large adult)   Pulse 99   Temp 36.7 °C (98.1 °F) (Temporal)   Ht 1.905 m (6' 3\")   Wt (!) 156 kg (344 lb)   SpO2 93%  Body mass index is 43 kg/m².    Physical Exam  Constitutional:       General: He is not in acute distress.     Appearance: Normal appearance. He is normal weight. He is not ill-appearing or toxic-appearing.   HENT:      Head: Normocephalic and atraumatic.      Right Ear: Tympanic membrane normal.      Left Ear: Tympanic membrane normal.      Nose: Nose normal.      Mouth/Throat:      Mouth: Mucous membranes are moist.      Pharynx: Oropharynx is clear. No posterior oropharyngeal erythema.   Eyes:      Extraocular Movements: Extraocular movements intact.      Conjunctiva/sclera: Conjunctivae normal.      Pupils: Pupils are equal, round, and reactive to light.   Cardiovascular:      Rate and Rhythm: Normal rate and regular rhythm.      Pulses: Normal pulses.      Heart sounds: Normal heart sounds. No murmur heard.    No friction rub. No gallop.   Pulmonary:      Effort: Pulmonary effort is normal. No respiratory distress.      Breath sounds: Normal breath sounds.   Musculoskeletal:         General: Normal range of motion.      Cervical back: Normal range of motion and neck supple.   Lymphadenopathy:      Cervical: No cervical adenopathy.   Skin:     General: Skin is warm and dry.      Capillary Refill: " Capillary refill takes less than 2 seconds.   Neurological:      General: No focal deficit present.      Mental Status: He is alert and oriented to person, place, and time.   Psychiatric:         Mood and Affect: Mood normal.         Behavior: Behavior normal.           Labs: According to patient's phone labs that he screenshot it from work he has elevated cholesterol and elevated fasting blood sugar    Assessment & Plan: Medical Decision Making   46 y.o. male with the following -    Problem List Items Addressed This Visit     ROYER (obstructive sleep apnea)     Chronic condition currently active  - Continue to use CPAP as directed  - Qvar 80 mcg per attenuation 1 puff each nostril daily as directed           Elevated fasting glucose     This is a new condition of uncertain prognosis  - POCT hemoglobin A1c drawn in clinic results are as follows:12.5%  -Recommend healthy diet such as Mediterranean  - Recommend dedicated daily exercise of at least 30 minutes/day most days the week for total of 150 minutes weekly  - Labs ordered:           Relevant Medications    metFORMIN ER (GLUCOPHAGE XR) 500 MG TABLET SR 24 HR    Other Relevant Orders    POCT Hemoglobin A1C (Completed)    CBC WITHOUT DIFFERENTIAL    Comp Metabolic Panel    Lipid Profile    TSH WITH REFLEX TO FT4    VITAMIN D,25 HYDROXY    Referral to Diabetic Education    BMI 40.0-44.9, adult ([facility]CC)     Chronic condition currently active  - Recommend referral to nutritionist  - Recommend referral to diabetic educator  - Recommend dedicated exercise of at least 30 minutes most days of the week 150 minutes total           Relevant Medications    metFORMIN ER (GLUCOPHAGE XR) 500 MG TABLET SR 24 HR    Other Relevant Orders    CBC WITHOUT DIFFERENTIAL    Comp Metabolic Panel    Lipid Profile    TSH WITH REFLEX TO FT4    VITAMIN D,25 HYDROXY    Referral to Diabetic Education    Type 2 diabetes mellitus without complication, without long-term current use of insulin ([facility])      Newly diagnosed condition currently unstable  - Hemoglobin A1c done in clinic today equals 12.5%  - Metformin  mg twice daily prescribed.  Risks/benefits and side effects discussed  - Referral to nutritionist  - Referral to diabetic educator  - ED precautions given and known for worsening or exacerbation of this condition           Relevant Medications    metFORMIN ER (GLUCOPHAGE XR) 500 MG TABLET SR 24 HR    Other Relevant Orders    CBC WITHOUT DIFFERENTIAL    Comp Metabolic Panel    Lipid Profile    TSH WITH REFLEX TO FT4    VITAMIN D,25 HYDROXY    Referral to Diabetic Education    Referral to Optometry    URINALYSIS    MICROALBUMIN CREAT RATIO URINE          Differential diagnosis, natural history, supportive care, and indications for immediate follow-up discussed.  Shared decision making approach was utilized, and patient is amendable with plan of care.  Patient understands to return to clinic or go to the emergency department if symptoms worsen. All questions and concerns addressed.      Return in about 2 weeks (around 7/1/2022).    Please note that this dictation was created using voice recognition software. I have made every reasonable attempt to correct obvious errors, but I expect that there are errors of grammar and possibly content that I did not discover before finalizing the note.

## 2022-06-17 NOTE — ASSESSMENT & PLAN NOTE
Newly diagnosed condition currently unstable  - Hemoglobin A1c done in clinic today equals 12.5%  - Metformin  mg twice daily prescribed.  Risks/benefits and side effects discussed  - Referral to nutritionist  - Referral to diabetic educator  - ED precautions given and known for worsening or exacerbation of this condition

## 2022-06-18 ENCOUNTER — HOSPITAL ENCOUNTER (OUTPATIENT)
Dept: LAB | Facility: MEDICAL CENTER | Age: 47
End: 2022-06-18
Payer: COMMERCIAL

## 2022-06-18 DIAGNOSIS — E11.9 TYPE 2 DIABETES MELLITUS WITHOUT COMPLICATION, WITHOUT LONG-TERM CURRENT USE OF INSULIN (HCC): ICD-10-CM

## 2022-06-18 DIAGNOSIS — R73.01 ELEVATED FASTING GLUCOSE: ICD-10-CM

## 2022-06-18 LAB
25(OH)D3 SERPL-MCNC: 23 NG/ML (ref 30–100)
ALBUMIN SERPL BCP-MCNC: 4.1 G/DL (ref 3.2–4.9)
ALBUMIN/GLOB SERPL: 1.4 G/DL
ALP SERPL-CCNC: 105 U/L (ref 30–99)
ALT SERPL-CCNC: 29 U/L (ref 2–50)
ANION GAP SERPL CALC-SCNC: 10 MMOL/L (ref 7–16)
APPEARANCE UR: CLEAR
AST SERPL-CCNC: 17 U/L (ref 12–45)
BILIRUB SERPL-MCNC: 0.4 MG/DL (ref 0.1–1.5)
BILIRUB UR QL STRIP.AUTO: NEGATIVE
BUN SERPL-MCNC: 14 MG/DL (ref 8–22)
CALCIUM SERPL-MCNC: 9.5 MG/DL (ref 8.5–10.5)
CHLORIDE SERPL-SCNC: 99 MMOL/L (ref 96–112)
CHOLEST SERPL-MCNC: 200 MG/DL (ref 100–199)
CO2 SERPL-SCNC: 25 MMOL/L (ref 20–33)
COLOR UR: YELLOW
CREAT SERPL-MCNC: 0.87 MG/DL (ref 0.5–1.4)
CREAT UR-MCNC: 102.38 MG/DL
ERYTHROCYTE [DISTWIDTH] IN BLOOD BY AUTOMATED COUNT: 39.4 FL (ref 35.9–50)
FASTING STATUS PATIENT QL REPORTED: NORMAL
GFR SERPLBLD CREATININE-BSD FMLA CKD-EPI: 107 ML/MIN/1.73 M 2
GLOBULIN SER CALC-MCNC: 3 G/DL (ref 1.9–3.5)
GLUCOSE SERPL-MCNC: 287 MG/DL (ref 65–99)
GLUCOSE UR STRIP.AUTO-MCNC: >=1000 MG/DL
HCT VFR BLD AUTO: 46.4 % (ref 42–52)
HDLC SERPL-MCNC: 33 MG/DL
HGB BLD-MCNC: 15.3 G/DL (ref 14–18)
KETONES UR STRIP.AUTO-MCNC: ABNORMAL MG/DL
LDLC SERPL CALC-MCNC: 125 MG/DL
LEUKOCYTE ESTERASE UR QL STRIP.AUTO: NEGATIVE
MCH RBC QN AUTO: 28 PG (ref 27–33)
MCHC RBC AUTO-ENTMCNC: 33 G/DL (ref 33.7–35.3)
MCV RBC AUTO: 85 FL (ref 81.4–97.8)
MICRO URNS: ABNORMAL
MICROALBUMIN UR-MCNC: <1.2 MG/DL
MICROALBUMIN/CREAT UR: NORMAL MG/G (ref 0–30)
NITRITE UR QL STRIP.AUTO: NEGATIVE
PH UR STRIP.AUTO: 5 [PH] (ref 5–8)
PLATELET # BLD AUTO: 256 K/UL (ref 164–446)
PMV BLD AUTO: 9.5 FL (ref 9–12.9)
POTASSIUM SERPL-SCNC: 4.7 MMOL/L (ref 3.6–5.5)
PROT SERPL-MCNC: 7.1 G/DL (ref 6–8.2)
PROT UR QL STRIP: NEGATIVE MG/DL
RBC # BLD AUTO: 5.46 M/UL (ref 4.7–6.1)
RBC UR QL AUTO: NEGATIVE
SODIUM SERPL-SCNC: 134 MMOL/L (ref 135–145)
SP GR UR STRIP.AUTO: 1.03
TRIGL SERPL-MCNC: 208 MG/DL (ref 0–149)
TSH SERPL DL<=0.005 MIU/L-ACNC: 1.27 UIU/ML (ref 0.38–5.33)
UROBILINOGEN UR STRIP.AUTO-MCNC: 0.2 MG/DL
WBC # BLD AUTO: 5.1 K/UL (ref 4.8–10.8)

## 2022-06-18 PROCEDURE — 82570 ASSAY OF URINE CREATININE: CPT

## 2022-06-18 PROCEDURE — 36415 COLL VENOUS BLD VENIPUNCTURE: CPT

## 2022-06-18 PROCEDURE — 82043 UR ALBUMIN QUANTITATIVE: CPT

## 2022-06-18 PROCEDURE — 85027 COMPLETE CBC AUTOMATED: CPT

## 2022-06-18 PROCEDURE — 81003 URINALYSIS AUTO W/O SCOPE: CPT

## 2022-06-18 PROCEDURE — 80053 COMPREHEN METABOLIC PANEL: CPT

## 2022-06-18 PROCEDURE — 80061 LIPID PANEL: CPT

## 2022-06-18 PROCEDURE — 84443 ASSAY THYROID STIM HORMONE: CPT

## 2022-06-18 PROCEDURE — 82306 VITAMIN D 25 HYDROXY: CPT

## 2022-06-22 ENCOUNTER — OFFICE VISIT (OUTPATIENT)
Dept: MEDICAL GROUP | Facility: PHYSICIAN GROUP | Age: 47
End: 2022-06-22
Payer: COMMERCIAL

## 2022-06-22 VITALS
HEART RATE: 96 BPM | RESPIRATION RATE: 20 BRPM | OXYGEN SATURATION: 93 % | WEIGHT: 315 LBS | DIASTOLIC BLOOD PRESSURE: 66 MMHG | TEMPERATURE: 98.4 F | HEIGHT: 75 IN | BODY MASS INDEX: 39.17 KG/M2 | SYSTOLIC BLOOD PRESSURE: 106 MMHG

## 2022-06-22 DIAGNOSIS — E55.9 VITAMIN D DEFICIENCY: ICD-10-CM

## 2022-06-22 DIAGNOSIS — E78.2 MIXED HYPERLIPIDEMIA: ICD-10-CM

## 2022-06-22 DIAGNOSIS — R73.01 ELEVATED FASTING GLUCOSE: ICD-10-CM

## 2022-06-22 DIAGNOSIS — E11.9 TYPE 2 DIABETES MELLITUS WITHOUT COMPLICATION, WITHOUT LONG-TERM CURRENT USE OF INSULIN (HCC): ICD-10-CM

## 2022-06-22 PROCEDURE — 99214 OFFICE O/P EST MOD 30 MIN: CPT

## 2022-06-22 RX ORDER — METFORMIN HYDROCHLORIDE EXTENDED-RELEASE TABLETS 1000 MG/1
1000 TABLET, FILM COATED, EXTENDED RELEASE ORAL DAILY
Qty: 90 TABLET | Refills: 1 | Status: SHIPPED | OUTPATIENT
Start: 2022-06-22 | End: 2022-12-27

## 2022-06-22 ASSESSMENT — FIBROSIS 4 INDEX: FIB4 SCORE: 0.57

## 2022-06-22 NOTE — PROGRESS NOTES
Subjective:     CC: Diabetes follow-up, lab review  HPI:   Luc presents today with    Problem   Mixed Hyperlipidemia    This is a new condition for which patient does not currently take any medication for.  He reports he was not eating healthy nor was he exercising prior to having labs drawn.  He reports for the past week he has diligently made dietary changes to cut out processed carbohydrates, sweets, and sodas.  He is also incorporating exercise into his daily routine.  He is not interested in starting a statin at this time.  He would like to try dietary measures and lifestyle changes first.  We will recheck in 6 months, if no improvements are made he is amendable to starting cholesterol-lowering agent.  Was recent lipid panel as follows:   Latest Reference Range & Units 06/18/22 09:02   Cholesterol,Tot 100 - 199 mg/dL 200 (H)   Triglycerides 0 - 149 mg/dL 208 (H)   HDL >=40 mg/dL 33 !   LDL <100 mg/dL 125 (H)   (H): Data is abnormally high  !: Data is abnormal     Vitamin D Deficiency    This is a new condition currently active  - Patient's vitamin D level 24  - He does supplement daily with a multivitamin     Type 2 Diabetes Mellitus Without Complication, Without Long-Term Current Use of Insulin ([facility])    This is a newly diagnosed condition for which patient has been taking metformin 500 mg SR for the past week and a half.  He is feeling much better.  He has markedly improved his diet by cutting out processed foods and excessive carbohydrates.  He is not drinking sodas.  He has started incorporating more exercise into his daily routine.  He reports improvement in his symptoms such as polyuria, polydipsia and polyphagia.  He is tolerating the medication well without any reported side effects.  We will increase metformin SR to 1000 mg daily and recheck hemoglobin A1c in 2 months.  He has an appointment to see the diabetic educator on June 30.         Health Maintenance: Recommend dedicated health maintenance  "visit once condition has stabilized    ROS:  Review of Systems   All other systems reviewed and are negative.      Objective:     Exam:  /66 (BP Location: Left arm, Patient Position: Sitting, BP Cuff Size: Large adult)   Pulse 96   Temp 36.9 °C (98.4 °F) (Temporal)   Resp 20   Ht 1.905 m (6' 3\")   Wt (!) 156 kg (345 lb)   SpO2 93%   BMI 43.12 kg/m²  Body mass index is 43.12 kg/m².    Physical Exam  Constitutional:       Appearance: Normal appearance.   HENT:      Head: Normocephalic.   Eyes:      Conjunctiva/sclera: Conjunctivae normal.      Pupils: Pupils are equal, round, and reactive to light.   Pulmonary:      Effort: Pulmonary effort is normal.   Musculoskeletal:         General: Normal range of motion.   Skin:     General: Skin is warm and dry.   Neurological:      General: No focal deficit present.      Mental Status: He is alert and oriented to person, place, and time.   Psychiatric:         Mood and Affect: Mood normal.         Behavior: Behavior normal.         Labs: CBC within normal limits, CMP with sodium 134, glucose 287, alk phos 105, Gleick stated hemoglobin elevated at 12.5, lipid panel elevated, microalbumin creatinine ratio within normal limits, urine with elevated glucose and trace ketones, vitamin D low at 23, TSH within normal limits    Assessment & Plan: Medical Decision Making     46 y.o. male with the following -     Problem List Items Addressed This Visit     Elevated fasting glucose    Relevant Medications    metFORMIN ER 1000 MG TABLET SR 24 HR    Type 2 diabetes mellitus without complication, without long-term current use of insulin ([facility])     New condition currently active  - Continue to eat healthy including cutting out processed carbohydrates and sweets  - Diabetic education included in instructions for this visit  - Keep appointment with diabetic educator June 30  - Metformin increased to 1000 mg daily as patient tolerated 500 mg very well without adverse effects  -ED " precautions given and known for worsening or progression of this condition or symptoms such as loss of consciousness, altered mental status, fainting or dizziness           Relevant Medications    metFORMIN ER 1000 MG TABLET SR 24 HR    Other Relevant Orders    Diabetic Monofilament LE Exam (Completed)    Mixed hyperlipidemia     New condition currently active  - Recommend healthy diet and exercise as discussed  - We will recheck lipid panel in December 2022           Vitamin D deficiency     New condition currently active  - Recommend supplementation with vitamin D 3 2000 IUs daily  - We will recheck vitamin D level in 6 months                 Differential diagnosis, natural history, supportive care, and indications for immediate follow-up discussed.  Shared decision making approach utilized, and patient is amendable with plan of care.  Patient understands to return to clinic or go to the emergency department if symptoms worsen. All questions and concerns addressed.    Return in about 2 months (around 8/22/2022) for F/U DM.    Please note that this dictation was created using voice recognition software. I have made every reasonable attempt to correct obvious errors, but I expect that there are errors of grammar and possibly content that I did not discover before finalizing the note.

## 2022-06-22 NOTE — PATIENT INSTRUCTIONS
Type 2 Diabetes Mellitus, Diagnosis, Adult  Type 2 diabetes (type 2 diabetes mellitus) is a long-term (chronic) disease. It may be caused by one or both of these problems:  · Your pancreas does not make enough of a hormone called insulin.  · Your body does not react in a normal way to insulin that it makes.  Insulin lets sugars (glucose) go into cells in your body. This gives you energy. If you have type 2 diabetes, sugars cannot get into cells. This causes high blood sugar (hyperglycemia).  Your doctor will set treatment goals for you. Generally, you should have these blood sugar levels:  · Before meals (preprandial):  mg/dL (4.4-7.2 mmol/L).  · After meals (postprandial): below 180 mg/dL (10 mmol/L).  · A1c (hemoglobin A1c) level: less than 7%.  Follow these instructions at home:  Questions to ask your doctor  · You may want to ask these questions:  ? Do I need to meet with a diabetes educator?  ? Where can I find a support group for people with diabetes?  ? What equipment will I need to care for myself at home?  ? What diabetes medicines do I need? When should I take them?  ? How often do I need to check my blood sugar?  ? What number can I call if I have questions?  ? When is my next doctor's visit?  General instructions  · Take over-the-counter and prescription medicines only as told by your doctor.  · Keep all follow-up visits as told by your doctor. This is important.  Contact a doctor if:  · Your blood sugar is at or above 240 mg/dL (13.3 mmol/L) for 2 days in a row.  · You have been sick for 2 days or more, and you are not getting better.  · You have had a fever for 2 days or more, and you are not getting better.  · You have any of these problems for more than 6 hours:  ? You cannot eat or drink.  ? You feel sick to your stomach (nauseous).  ? You throw up (vomit).  ? You have watery poop (diarrhea).  Get help right away if:  · Your blood sugar is lower than 54 mg/dL (3 mmol/L).  · You get  confused.  · You have trouble:  ? Thinking clearly.  ? Breathing.  · You have moderate or large ketone levels in your pee (urine).  Summary  · Type 2 diabetes is a long-term (chronic) disease. Your pancreas may not make enough of a hormone called insulin, or your body may not react normally to insulin that it makes.  · Take over-the-counter and prescription medicines only as told by your doctor.  · Keep all follow-up visits as told by your doctor. This is important.  This information is not intended to replace advice given to you by your health care provider. Make sure you discuss any questions you have with your health care provider.  Document Released: 09/26/2009 Document Revised: 02/15/2019 Document Reviewed: 01/20/2017  [employer] Patient Education © 2020 [employer]      Diabetes Mellitus and Foot Care  Foot care is an important part of your health, especially when you have diabetes. Diabetes may cause you to have problems because of poor blood flow (circulation) to your feet and legs, which can cause your skin to:  · Become thinner and drier.  · Break more easily.  · Heal more slowly.  · Peel and crack.  You may also have nerve damage (neuropathy) in your legs and feet, causing decreased feeling in them. This means that you may not notice minor injuries to your feet that could lead to more serious problems. Noticing and addressing any potential problems early is the best way to prevent future foot problems.  How to care for your feet  Foot hygiene  · Wash your feet daily with warm water and mild soap. Do not use hot water. Then, pat your feet and the areas between your toes until they are completely dry. Do not soak your feet as this can dry your skin.  · Trim your toenails straight across. Do not dig under them or around the cuticle. File the edges of your nails with an emery board or nail file.  · Apply a moisturizing lotion or petroleum jelly to the skin on your feet and to dry, brittle toenails. Use lotion  that does not contain alcohol and is unscented. Do not apply lotion between your toes.  Shoes and socks  · Wear clean socks or stockings every day. Make sure they are not too tight. Do not wear knee-high stockings since they may decrease blood flow to your legs.  · Wear shoes that fit properly and have enough cushioning. Always look in your shoes before you put them on to be sure there are no objects inside.  · To break in new shoes, wear them for just a few hours a day. This prevents injuries on your feet.  Wounds, scrapes, corns, and calluses  · Check your feet daily for blisters, cuts, bruises, sores, and redness. If you cannot see the bottom of your feet, use a mirror or ask someone for help.  · Do not cut corns or calluses or try to remove them with medicine.  · If you find a minor scrape, cut, or break in the skin on your feet, keep it and the skin around it clean and dry. You may clean these areas with mild soap and water. Do not clean the area with peroxide, alcohol, or iodine.  · If you have a wound, scrape, corn, or callus on your foot, look at it several times a day to make sure it is healing and not infected. Check for:  ? Redness, swelling, or pain.  ? Fluid or blood.  ? Warmth.  ? Pus or a bad smell.  General instructions  · Do not cross your legs. This may decrease blood flow to your feet.  · Do not use heating pads or hot water bottles on your feet. They may burn your skin. If you have lost feeling in your feet or legs, you may not know this is happening until it is too late.  · Protect your feet from hot and cold by wearing shoes, such as at the beach or on hot pavement.  · Schedule a complete foot exam at least once a year (annually) or more often if you have foot problems. If you have foot problems, report any cuts, sores, or bruises to your health care provider immediately.  Contact a health care provider if:  · You have a medical condition that increases your risk of infection and you have any  cuts, sores, or bruises on your feet.  · You have an injury that is not healing.  · You have redness on your legs or feet.  · You feel burning or tingling in your legs or feet.  · You have pain or cramps in your legs and feet.  · Your legs or feet are numb.  · Your feet always feel cold.  · You have pain around a toenail.  Get help right away if:  · You have a wound, scrape, corn, or callus on your foot and:  ? You have pain, swelling, or redness that gets worse.  ? You have fluid or blood coming from the wound, scrape, corn, or callus.  ? Your wound, scrape, corn, or callus feels warm to the touch.  ? You have pus or a bad smell coming from the wound, scrape, corn, or callus.  ? You have a fever.  ? You have a red line going up your leg.  Summary  · Check your feet every day for cuts, sores, red spots, swelling, and blisters.  · Moisturize feet and legs daily.  · Wear shoes that fit properly and have enough cushioning.  · If you have foot problems, report any cuts, sores, or bruises to your health care provider immediately.  · Schedule a complete foot exam at least once a year (annually) or more often if you have foot problems.  This information is not intended to replace advice given to you by your health care provider. Make sure you discuss any questions you have with your health care provider.  Document Released: 12/15/2001 Document Revised: 01/30/2019 Document Reviewed: 01/19/2018  [employer] Patient Education © 2020 [employer]      Preventing Diabetes Mellitus Complications  You can take action to prevent or slow down problems that are caused by diabetes (diabetes mellitus). Following your diabetes plan and taking care of yourself can reduce your risk of serious or life-threatening complications.  What actions can I take to prevent diabetes complications?  Manage your diabetes    · Follow instructions from your health care providers about managing your diabetes. Your diabetes may be managed by a team of  health care providers who can teach you how to care for yourself and can answer questions that you have.  · Educate yourself about your condition so you can make healthy choices about eating and physical activity.  · Check your blood sugar (glucose) levels as often as directed. Your health care provider will help you decide how often to check your blood glucose level depending on your treatment goals and how well you are meeting them.  · Ask your health care provider if you should take low-dose aspirin daily and what dose is recommended for you. Taking low-dose aspirin daily is recommended to help prevent cardiovascular disease.  Do not use nicotine or tobacco  Do not use any products that contain nicotine or tobacco, such as cigarettes and e-cigarettes. If you need help quitting, ask your health care provider. Nicotine raises your risk for diabetes problems. If you quit using nicotine:  · You will lower your risk for heart attack, stroke, nerve disease, and kidney disease.  · Your cholesterol and blood pressure may improve.  · Your blood circulation will improve.  Keep your blood pressure under control  Your personal target blood pressure is determined based on:  · Your age.  · Your medicines.  · How long you have had diabetes.  · Any other medical conditions you have.  To control your blood pressure:  · Follow instructions from your health care provider about meal planning, exercise, and medicines.  · Make sure your health care provider checks your blood pressure at every medical visit.  · Monitor your blood pressure at home as told by your health care provider.    Keep your cholesterol under control  To control your cholesterol:  · Follow instructions from your health care provider about meal planning, exercise, and medicines.  · Have your cholesterol checked at least once a year.  · You may be prescribed medicine to lower cholesterol (statin). If you are not taking a statin, ask your health care provider if you  should be.  Controlling your cholesterol may:  · Help prevent heart disease and stroke. These are the most common health problems for people with diabetes.  · Improve your blood flow.  Schedule and keep yearly physical exams and eye exams  Your health care provider will tell you how often you need medical visits depending on your diabetes management plan. Keep all follow-up visits as directed. This is important so possible problems can be identified early and complications can be avoided or treated.  · Every visit with your health care provider should include measuring your:  ? Weight.  ? Blood pressure.  ? Blood glucose control.  · Your A1c (hemoglobin A1c) level should be checked:  ? At least 2 times a year, if you are meeting your treatment goals.  ? 4 times a year, if you are not meeting treatment goals or if your treatment goals have changed.  · Your blood lipids (lipid profile) should be checked yearly. You should also be checked yearly for protein in your urine (urine microalbumin).  · If you have type 1 diabetes, get an eye exam 3-5 years after you are diagnosed, and then once a year after your first exam.  · If you have type 2 diabetes, get an eye exam as soon as you are diagnosed, and then once a year after your first exam.  Keep your vaccines current  It is recommended that you receive:  · A flu (influenza) vaccine every year.  · A pneumonia (pneumococcal) vaccine and a hepatitis B vaccine. If you are age 65 or older, you may get the pneumonia vaccine as a series of two separate shots.  Ask your health care provider which other vaccines may be recommended.  Take care of your feet  Diabetes may cause you to have poor blood circulation to your legs and feet. Because of this, taking care of your feet is very important. Diabetes can cause:  · The skin on the feet to get thinner, break more easily, and heal more slowly.  · Nerve damage in your legs and feet, which results in decreased feeling. You may not  notice minor injuries that could lead to serious problems.  To avoid foot problems:  · Check your skin and feet every day for cuts, bruises, redness, blisters, or sores.  · Schedule a foot exam with your health care provider once every year. This exam includes:  ? Inspecting of the structure and skin of your feet.  ? Checking the pulses and sensation in your feet.  · Make sure that your health care provider performs a visual foot exam at every medical visit.    Take care of your teeth  People with poorly controlled diabetes are more likely to have gum (periodontal) disease. Diabetes can make periodontal diseases harder to control. If not treated, periodontal diseases can lead to tooth loss. To prevent this:  · Brush your teeth twice a day.  · Floss at least once a day.  · Visit your dentist 2 times a year.  Drink responsibly  Limit alcohol intake to no more than 1 drink a day for nonpregnant women and 2 drinks a day for men. One drink equals 12 oz of beer, 5 oz of wine, or 1½ oz of hard liquor.   It is important to eat food when you drink alcohol to avoid low blood glucose (hypoglycemia). Avoid alcohol if you:  · Have a history of alcohol abuse or dependence.  · Are pregnant.  · Have liver disease, pancreatitis, advanced neuropathy, or severe hypertriglyceridemia.  Lessen stress  Living with diabetes can be stressful. When you are experiencing stress, your blood glucose may be affected in two ways:  · Stress hormones may cause your blood glucose to rise.  · You may be distracted from taking good care of yourself.  Be aware of your stress level and make changes to help you manage challenging situations. To lower your stress levels:  · Consider joining a support group.  · Do planned relaxation or meditation.  · Do a hobby that you enjoy.  · Maintain healthy relationships.  · Exercise regularly.  · Work with your health care provider or a mental health professional.  Summary  · You can take action to prevent or slow  down problems that are caused by diabetes (diabetes mellitus). Following your diabetes plan and taking care of yourself can reduce your risk of serious or life-threatening complications.  · Follow instructions from your health care providers about managing your diabetes. Your diabetes may be managed by a team of health care providers who can teach you how to care for yourself and can answer questions that you have.  · Your health care provider will tell you how often you need medical visits depending on your diabetes management plan. Keep all follow-up visits as directed. This is important so possible problems can be identified early and complications can be avoided or treated.  This information is not intended to replace advice given to you by your health care provider. Make sure you discuss any questions you have with your health care provider.  Document Released: 09/04/2012 Document Revised: 03/18/2019 Document Reviewed: 09/16/2017  [employer] Patient Education © 2020 [employer]      Diabetes Mellitus and Exercise  Exercising regularly is important for your overall health, especially when you have diabetes (diabetes mellitus). Exercising is not only about losing weight. It has many other health benefits, such as increasing muscle strength and bone density and reducing body fat and stress. This leads to improved fitness, flexibility, and endurance, all of which result in better overall health.  Exercise has additional benefits for people with diabetes, including:  · Reducing appetite.  · Helping to lower and control blood glucose.  · Lowering blood pressure.  · Helping to control amounts of fatty substances (lipids) in the blood, such as cholesterol and triglycerides.  · Helping the body to respond better to insulin (improving insulin sensitivity).  · Reducing how much insulin the body needs.  · Decreasing the risk for heart disease by:  ? Lowering cholesterol and triglyceride levels.  ? Increasing the levels of  good cholesterol.  ? Lowering blood glucose levels.  What is my activity plan?  Your health care provider or certified diabetes educator can help you make a plan for the type and frequency of exercise (activity plan) that works for you. Make sure that you:  · Do at least 150 minutes of moderate-intensity or vigorous-intensity exercise each week. This could be brisk walking, biking, or water aerobics.  ? Do stretching and strength exercises, such as yoga or weightlifting, at least 2 times a week.  ? Spread out your activity over at least 3 days of the week.  · Get some form of physical activity every day.  ? Do not go more than 2 days in a row without some kind of physical activity.  ? Avoid being inactive for more than 30 minutes at a time. Take frequent breaks to walk or stretch.  · Choose a type of exercise or activity that you enjoy, and set realistic goals.  · Start slowly, and gradually increase the intensity of your exercise over time.  What do I need to know about managing my diabetes?    · Check your blood glucose before and after exercising.  ? If your blood glucose is 240 mg/dL (13.3 mmol/L) or higher before you exercise, check your urine for ketones. If you have ketones in your urine, do not exercise until your blood glucose returns to normal.  ? If your blood glucose is 100 mg/dL (5.6 mmol/L) or lower, eat a snack containing 15-20 grams of carbohydrate. Check your blood glucose 15 minutes after the snack to make sure that your level is above 100 mg/dL (5.6 mmol/L) before you start your exercise.  · Know the symptoms of low blood glucose (hypoglycemia) and how to treat it. Your risk for hypoglycemia increases during and after exercise. Common symptoms of hypoglycemia can include:  ? Hunger.  ? Anxiety.  ? Sweating and feeling clammy.  ? Confusion.  ? Dizziness or feeling light-headed.  ? Increased heart rate or palpitations.  ? Blurry vision.  ? Tingling or numbness around the mouth, lips, or  tongue.  ? Tremors or shakes.  ? Irritability.  · Keep a rapid-acting carbohydrate snack available before, during, and after exercise to help prevent or treat hypoglycemia.  · Avoid injecting insulin into areas of the body that are going to be exercised. For example, avoid injecting insulin into:  ? The arms, when playing tennis.  ? The legs, when jogging.  · Keep records of your exercise habits. Doing this can help you and your health care provider adjust your diabetes management plan as needed. Write down:  ? Food that you eat before and after you exercise.  ? Blood glucose levels before and after you exercise.  ? The type and amount of exercise you have done.  ? When your insulin is expected to peak, if you use insulin. Avoid exercising at times when your insulin is peaking.  · When you start a new exercise or activity, work with your health care provider to make sure the activity is safe for you, and to adjust your insulin, medicines, or food intake as needed.  · Drink plenty of water while you exercise to prevent dehydration or heat stroke. Drink enough fluid to keep your urine clear or pale yellow.  Summary  · Exercising regularly is important for your overall health, especially when you have diabetes (diabetes mellitus).  · Exercising has many health benefits, such as increasing muscle strength and bone density and reducing body fat and stress.  · Your health care provider or certified diabetes educator can help you make a plan for the type and frequency of exercise (activity plan) that works for you.  · When you start a new exercise or activity, work with your health care provider to make sure the activity is safe for you, and to adjust your insulin, medicines, or food intake as needed.  This information is not intended to replace advice given to you by your health care provider. Make sure you discuss any questions you have with your health care provider.  Document Released: 03/09/2005 Document Revised:  07/12/2018 Document Reviewed: 05/29/2017  [employer] Patient Education © 2020 [employer]    Diabetes, Frequently Asked Questions  WHAT IS DIABETES?  Most of the food we eat is turned into glucose (sugar). Our bodies use it for energy. The pancreas makes a hormone called insulin. It helps glucose get into the cells of our bodies. When you have diabetes, your body either does not make enough insulin or cannot use its own insulin as well as it should. This causes sugars to build up in your blood.  WHAT ARE THE SYMPTOMS OF DIABETES?  · Frequent urination.   · Excessive thirst.   · Unexplained weight loss.   · Extreme hunger.   · Blurred vision.   · Tingling or numbness in hands or feet.   · Feeling very tired much of the time.   · Dry, itchy skin.   · Sores that are slow to heal.   · Yeast infections.   WHAT ARE THE TYPES OF DIABETES?  Type 1 Diabetes   · About 10% of affected people have this type.   · Usually occurs before the age of 30.   · Usually occurs in thin to normal weight people.   Type 2 Diabetes  · About 90% of affected people have this type.   · Usually occurs after the age of 40.   · Usually occurs in overweight people.   · More likely to have:   · A family history of diabetes.   · A history of diabetes during pregnancy (gestational diabetes).   · High blood pressure.   · High cholesterol and triglycerides.   Gestational Diabetes  · Occurs in about 4% of pregnancies.   · Usually goes away after the baby is born.   · More likely to occur in women with:   · Family history of diabetes.   · Previous gestational diabetes.   · Obese.   · Over 25 years old.   WHAT IS PRE-DIABETES?  Pre-diabetes means your blood glucose is higher than normal, but lower than the diabetes range. It also means you are at risk of getting type 2 diabetes and heart disease. If you are told you have pre-diabetes, have your blood glucose checked again in 1 to 2 years.  WHAT IS THE TREATMENT FOR DIABETES?  Treatment is aimed at keeping  blood glucose near normal levels at all times. Learning how to manage this yourself is important in treating diabetes. Depending on the type of diabetes you have, your treatment will include one or more of the following:  · Monitoring your blood glucose.   · Meal planning.   · Exercise.   · Oral medicine (pills) or insulin.   CAN DIABETES BE PREVENTED?  With type 1 diabetes, prevention is more difficult, because the triggers that cause it are not yet known.  With type 2 diabetes, prevention is more likely, with lifestyle changes:  · Maintain a healthy weight.   · Eat healthy.   · Exercise.   IS THERE A CURE FOR DIABETES?  No, there is no cure for diabetes. There is a lot of research going on that is looking for a cure, and progress is being made. Diabetes can be treated and controlled. People with diabetes can manage their diabetes and lead normal, active lives.  SHOULD I BE TESTED FOR DIABETES?  If you are at least 45 years old, you should be tested for diabetes. You should be tested again every 3 years. If you are 45 or older and overweight, you may want to get tested more often. If you are younger than 45, overweight, and have one or more of the following risk factors, you should be tested:  · Family history of diabetes.   · Inactive lifestyle.   · High blood pressure.   WHAT ARE SOME OTHER SOURCES FOR INFORMATION ON DIABETES?  The following organizations may help in your search for more information on diabetes:  National Diabetes Education Program (NDEP)  Internet: http://www.ndep.nih.gov/resources  American Diabetes Association  Internet: http://www.diabetes.org   Juvenile Diabetes Foundation International  Internet: http://www.jdf.org  Document Released: 12/20/2004 Document Revised: 03/11/2013 Document Reviewed: 10/15/2010  [employer] Patient Information ©2013 [employer]  Diabetes, Type 2, Am I At Risk?  Diabetes is a lasting (chronic) disease. In type 2 diabetes, the pancreas does not make enough insulin,  and the body does not respond normally to the insulin that is made. This type of diabetes was also previously called adult onset diabetes. About 90% of all those who have diabetes have type 2. It usually occurs after the age of 40, but can occur at any age.   People develop type 2 diabetes because they do not use insulin properly. Eventually, the pancreas cannot make enough insulin for the body's needs. Over time, the amount of glucose (sugar) in the blood increases.  RISK FACTORS  · Overweight  the more weight you have, the more resistant your cells become to insulin.  · Family history  you are more likely to get diabetes if a parent or sibling has diabetes.  · Race certain races get diabetes more.  ·  Americans.  · American Indians.  ·  Americans.  · Hispanics.  · .  · Inactive exercise helps control weight and helps your cells be more sensitive to insulin.  · Gestational diabetes  some women develop diabetes while they are pregnant. This goes away when they deliver. However, they are 50-60% more likely to develop type 2 diabetes at a later time.  · Having a baby over 9 pounds  a sign that you may have had gestational diabetes.  · Age the risk of diabetes goes up as you get older, especially after age 45.  · High blood pressure (hypertension).  SYMPTOMS  Many people have no signs or symptoms. Symptoms can be so mild that you might not even notice them. Some of these signs are:  · Increased thirst.  · Increased hunger.  · Tiredness (fatigue).  · Increased urination, especially at night.  · Weight loss.  · Blurred vision.  · Sores that do not heal.  WHO SHOULD BE TESTED?  · Anyone 45 years or older, especially if overweight, should consider getting tested.  · If you are younger than 45, overweight, and have one or more of the risk factors, you should consider getting tested.  DIAGNOSIS  · Fasting blood glucose (FBS). Usually, 2 are done.  · -125 mg/dl is considered  pre-diabetes.  ·  mg/dl or greater is considered diabetes.  · 2 hour Oral Glucose Tolerance Test (OGTT). This test is preformed by first having you not eat or drink for several hours. You are then given something sweet to drink and your blood glucose is measured fasting, at one hour and 2 hours. This test tells how well you are able to handle sugars or carbohydrates.  · Fastin-100 mg/dl.  · 1 hour: less than 200 mg/dl.  · 2 hours: less than 140 mg/dl.  · A1c A1c is a blood glucose test that gives and average of your blood glucose over 3 months. It is the accepted method to use to diagnose diabetes.  · A1c 5.7-6.4% is considered pre-diabetes.  · A1c 6.5% or greater is considered diabetes.  WHAT DOES IT MEAN TO HAVE PRE-DIABETES?  Pre-diabetes means you are at risk for getting type 2 diabetes. Your blood glucose is higher than normal, but not yet high enough to diagnose diabetes. The good news is, if you have pre-diabetes you can reduce the risk of getting diabetes and even return to normal blood glucose levels. With modest weight loss and moderate physical activity, you can delay or prevent type 2 diabetes.   PREVENTION  You cannot do anything about race, age or family history, but you can lower your chances of getting diabetes. You can:   · Exercise regularly and be active.  · Reduce fat and calorie intake.  · Make wise food choices as much as you can.  · Reduce your intake of salt and alcohol.  · Maintain a reasonable weight.  · Keep blood pressure in an acceptable range. Take medication if needed.  · Not smoke.  · Maintain an acceptable cholesterol level (HDL, LDL, Triglycerides). Take medication if needed.  DOING MY PART: GETTING STARTED  Making big changes in your life is hard, especially if you are faced with more than one change. You can make it easier by taking these steps:  · Make a plan to change behavior.  · Decide exactly what you will do and when you will do it.  · Plan what you need to get  ready.  · Think about what might prevent you from reaching your goals.  · Find family and friends who will support and encourage you.  · Decide how you will reward yourself when you do what you have planned.  · Your doctor, dietitian, or counselor can help you make a plan.  HERE ARE SOME OF THE AREAS YOU MAY WISH TO CHANGE TO REDUCE YOUR RISK OF DIABETES.  If you are overweight or obese, choose sensible ways to get in shape. Even small amounts of weight loss, like 5-10 pounds, can help reduce the effects of insulin resistance and help blood glucose control.  Diet  · Avoid crash diets. Instead, eat less of the foods you usually have. Limit the amount of fat you eat.  · Increase your physical activity. Aim for at least 30 minutes of exercise most days of the week.  · Set a reasonable weight-loss goal, such as losing 1 pound a week. Aim for a long-term goal of losing 5-7% of your total body weight.  · Make wise food choices most of the time.  · What you eat has a big impact on your health. By making king food choices, you can help control your body weight, blood pressure, and cholesterol.  · Take a hard look at the serving sizes of the foods you eat. Reduce serving sizes of meat, desserts, and foods high in fat. Increase your intake of fruits and vegetables.  · Limit your fat intake to about 25% of your total calories. For example, if your food choices add up to about 2,000 calories a day, try to eat no more than 56 grams of fat. Your caregiver or a dietitian can help you figure out how much fat to have. You can check food labels for fat content too.  · You may also want to reduce the number of calories you have each day.  · Keep a food log. Write down what you eat, how much you eat, and anything else that helps keep you on track.  · When you meet your goal, reward yourself with a nonfood item or activity.  Exercise  · Be physically active every day.  · Keep and exercise log. Write down what exercise you did, for how  long, and anything else that keeps you on track.  · Regular exercise (like brisk walking) tackles several risk factors at once. It helps you lose weight, it keeps your cholesterol and blood pressure under control, and it helps your body use insulin. People who are physically active for 30 minutes a day, 5 days a week, reduced their risk of type 2 diabetes. If you are not very active, you should start slowly at first. Talk with your caregiver first about what kinds of exercise would be safe for you. Make a plan to increase your activity level with the goal of being active for at least 30 minutes a day, most days of the week.  · Choose activities you enjoy. Here are some ways to work extra activity into your daily routine:  · Take the stairs rather than an elevator or escalator.  · Park at the far end of the lot and walk.  · Get off the bus a few stops early and walk the rest of the way.  · Walk or bicycle instead of drive whenever you can.  Medications  Some people need medication to help control their blood pressure or cholesterol levels. If you do, take your medicines as directed. Ask your caregiver whether there are any medicines you can take to prevent type 2 diabetes.  Document Released: 12/20/2004 Document Revised: 03/11/2013 Document Reviewed: 09/15/2010  [employer]® Patient Information ©2014 [employer].      Diabetic Retinopathy  Diabetic retinopathy is a disease of the retina. The retina is a light-sensitive membrane at the back of the eye. Retinopathy is a complication of diabetes (diabetes mellitus) and a common cause of bad eyesight (visual impairment). It can eventually cause blindness. Early detection and treatment of diabetic retinopathy is important in keeping your eyes healthy and preventing further damage to them.  What are the causes?  Diabetic retinopathy is caused by blood sugar (glucose) levels that are too high for an extended period of time. High blood glucose over an extended period of time  can:  · Damage small blood vessels in the retina, allowing blood to leak through the vessel walls.  · Cause new, abnormal blood vessels to grow on the retina. This can scar the retina in the advanced stage of diabetic retinopathy.  What increases the risk?  You are more likely to develop this condition if:  · You have had diabetes for a long time.  · You have poorly controlled blood glucose.  · You have high blood pressure.  What are the signs or symptoms?  In the early stages of diabetic retinopathy, there are often no symptoms. As the condition gets worse, symptoms may include:  · Blurred vision. This is usually caused by swelling due to abnormal blood glucose levels. The blurriness may go away when blood glucose levels return to normal.  · Moving specks or dark spots (floaters) in your vision. These can be caused by a small amount of bleeding (hemorrhage) from retinal blood vessels.  · Missing parts of your field of vision, such as vision at the sides of the eyes. This can be caused by larger retinal hemorrhages.  · Difficulty reading.  · Double vision.  · Pain in one or both eyes.  · Feeling pressure in one or both eyes.  · Trouble seeing straight lines. Straight lines may not look straight.  · Redness of the eyes that does not go away.  How is this diagnosed?  This condition may be diagnosed with an eye exam in which your eye care specialist puts drops in your eyes that enlarge (dilate) your pupils. This lets your health care provider examine your retina and check for changes in your retinal blood vessels.  How is this treated?  This condition may be treated by:  · Keeping your blood glucose and blood pressure within a target range.  · Using a type of laser beam to seal your retinal blood vessels. This stops them from bleeding and decreases pressure in your eye.  · Getting shots of medicine in the eye to reduce swelling of the center of the retina (macula). You may be given:  ? Anti-VEGF medicine. This medicine  can help slow vision loss, and may even improve vision.  ? Steroid medicine.  Follow these instructions at home:    · Follow your diabetes management plan as directed by your health care provider. This may include exercising regularly and eating a healthy diet.  · Keep your blood glucose level and your blood pressure in your target range, as directed by your health care provider.  · Check your blood glucose as often as directed.  · Take over the counter and prescription medicines only as told by your health care provider. This includes insulin and oral diabetes medicine.  · Get your eyes checked at least once every year. An eye specialist can usually see diabetic retinopathy developing long before it starts to cause problems. In many cases, it can be treated to prevent complications from occurring.  · Do not use any products that contain nicotine or tobacco, such as cigarettes and e-cigarettes. If you need help quitting, ask your health care provider.  · Keep all follow-up visits as told by your health care provider. This is important.  Contact a health care provider if:  · You notice gradual blurring or other changes in your vision over time.  · You notice that your glasses or contact lenses do not make things look as sharp as they once did.  · You have trouble reading or seeing details at a distance with either eye.  · You notice a change in your vision or notice that parts of your field of vision appear missing or hazy.  · You suddenly see moving specks or dark spots in the field of vision of either eye.  Get help right away if:  · You have sudden pain or pressure in one or both eyes.  · You suddenly lose vision or a curtain or veil seems to come across your eyes.  · You have a sudden burst of floaters in your vision.  Summary  · Diabetic retinopathy is a disease of the retina. The retina is a light-sensitive membrane at the back of the eye. Retinopathy is a complication of diabetes.  · Get your eyes checked at  least once every year. An eye specialist can usually see diabetic retinopathy developing long before it starts to cause problems. In many cases, it can be treated to prevent complications from occurring.  · Keep your blood glucose and your blood pressure in target range. Follow your diabetes management plan as directed by your health care provider.  · Protect your eyes. Wear sunglasses and eye protection when needed.  This information is not intended to replace advice given to you by your health care provider. Make sure you discuss any questions you have with your health care provider.  Document Released: 12/15/2001 Document Revised: 01/30/2019 Document Reviewed: 01/22/2018  [employer] Patient Education © 2020 [employer]      Diabetes Mellitus and Nutrition, Adult  When you have diabetes (diabetes mellitus), it is very important to have healthy eating habits because your blood sugar (glucose) levels are greatly affected by what you eat and drink. Eating healthy foods in the appropriate amounts, at about the same times every day, can help you:  · Control your blood glucose.  · Lower your risk of heart disease.  · Improve your blood pressure.  · Reach or maintain a healthy weight.  Every person with diabetes is different, and each person has different needs for a meal plan. Your health care provider may recommend that you work with a diet and nutrition specialist (dietitian) to make a meal plan that is best for you. Your meal plan may vary depending on factors such as:  · The calories you need.  · The medicines you take.  · Your weight.  · Your blood glucose, blood pressure, and cholesterol levels.  · Your activity level.  · Other health conditions you have, such as heart or kidney disease.  How do carbohydrates affect me?  Carbohydrates, also called carbs, affect your blood glucose level more than any other type of food. Eating carbs naturally raises the amount of glucose in your blood. Carb counting is a method for  "keeping track of how many carbs you eat. Counting carbs is important to keep your blood glucose at a healthy level, especially if you use insulin or take certain oral diabetes medicines.  It is important to know how many carbs you can safely have in each meal. This is different for every person. Your dietitian can help you calculate how many carbs you should have at each meal and for each snack.  Foods that contain carbs include:  · Bread, cereal, rice, pasta, and crackers.  · Potatoes and corn.  · Peas, beans, and lentils.  · Milk and yogurt.  · Fruit and juice.  · Desserts, such as cakes, cookies, ice cream, and candy.  How does alcohol affect me?  Alcohol can cause a sudden decrease in blood glucose (hypoglycemia), especially if you use insulin or take certain oral diabetes medicines. Hypoglycemia can be a life-threatening condition. Symptoms of hypoglycemia (sleepiness, dizziness, and confusion) are similar to symptoms of having too much alcohol.  If your health care provider says that alcohol is safe for you, follow these guidelines:  · Limit alcohol intake to no more than 1 drink per day for nonpregnant women and 2 drinks per day for men. One drink equals 12 oz of beer, 5 oz of wine, or 1½ oz of hard liquor.  · Do not drink on an empty stomach.  · Keep yourself hydrated with water, diet soda, or unsweetened iced tea.  · Keep in mind that regular soda, juice, and other mixers may contain a lot of sugar and must be counted as carbs.  What are tips for following this plan?    Reading food labels  · Start by checking the serving size on the \"Nutrition Facts\" label of packaged foods and drinks. The amount of calories, carbs, fats, and other nutrients listed on the label is based on one serving of the item. Many items contain more than one serving per package.  · Check the total grams (g) of carbs in one serving. You can calculate the number of servings of carbs in one serving by dividing the total carbs by 15. For " "example, if a food has 30 g of total carbs, it would be equal to 2 servings of carbs.  · Check the number of grams (g) of saturated and trans fats in one serving. Choose foods that have low or no amount of these fats.  · Check the number of milligrams (mg) of salt (sodium) in one serving. Most people should limit total sodium intake to less than 2,300 mg per day.  · Always check the nutrition information of foods labeled as \"low-fat\" or \"nonfat\". These foods may be higher in added sugar or refined carbs and should be avoided.  · Talk to your dietitian to identify your daily goals for nutrients listed on the label.  Shopping  · Avoid buying canned, premade, or processed foods. These foods tend to be high in fat, sodium, and added sugar.  · Shop around the outside edge of the grocery store. This includes fresh fruits and vegetables, bulk grains, fresh meats, and fresh dairy.  Cooking  · Use low-heat cooking methods, such as baking, instead of high-heat cooking methods like deep frying.  · Cook using healthy oils, such as olive, canola, or sunflower oil.  · Avoid cooking with butter, cream, or high-fat meats.  Meal planning  · Eat meals and snacks regularly, preferably at the same times every day. Avoid going long periods of time without eating.  · Eat foods high in fiber, such as fresh fruits, vegetables, beans, and whole grains. Talk to your dietitian about how many servings of carbs you can eat at each meal.  · Eat 4-6 ounces (oz) of lean protein each day, such as lean meat, chicken, fish, eggs, or tofu. One oz of lean protein is equal to:  ? 1 oz of meat, chicken, or fish.  ? 1 egg.  ? ¼ cup of tofu.  · Eat some foods each day that contain healthy fats, such as avocado, nuts, seeds, and fish.  Lifestyle  · Check your blood glucose regularly.  · Exercise regularly as told by your health care provider. This may include:  ? 150 minutes of moderate-intensity or vigorous-intensity exercise each week. This could be brisk " walking, biking, or water aerobics.  ? Stretching and doing strength exercises, such as yoga or weightlifting, at least 2 times a week.  · Take medicines as told by your health care provider.  · Do not use any products that contain nicotine or tobacco, such as cigarettes and e-cigarettes. If you need help quitting, ask your health care provider.  · Work with a counselor or diabetes educator to identify strategies to manage stress and any emotional and social challenges.  Questions to ask a health care provider  · Do I need to meet with a diabetes educator?  · Do I need to meet with a dietitian?  · What number can I call if I have questions?  · When are the best times to check my blood glucose?  Where to find more information:  · American Diabetes Association: diabetes.org  · Academy of Nutrition and Dietetics: www.eatright.org  · National [place] of Diabetes and Digestive and Kidney Diseases (NIH): www.niddk.nih.gov  Summary  · A healthy meal plan will help you control your blood glucose and maintain a healthy lifestyle.  · Working with a diet and nutrition specialist (dietitian) can help you make a meal plan that is best for you.  · Keep in mind that carbohydrates (carbs) and alcohol have immediate effects on your blood glucose levels. It is important to count carbs and to use alcohol carefully.  This information is not intended to replace advice given to you by your health care provider. Make sure you discuss any questions you have with your health care provider.  Document Released: 09/14/2006 Document Revised: 11/30/2018 Document Reviewed: 01/22/2018  [employer] Patient Education © 2020 [employer]    2000 Calorie Diabetic Diet  The 2000 calorie diabetic diet is designed for eating up to 2000 calories each day. Following this diet and making healthy meal choices can help improve overall health. It controls blood glucose (sugar) levels. It can also lower blood pressure and cholesterol.  SERVING SIZES  Measuring  foods and serving sizes helps to make sure you are getting the right amount of food. The list below tells how big or small some common serving sizes are.  · 1 oz.........4 stacked dice.   · 3 oz.........Deck of cards.   · 1 tsp........Tip of little finger.   · 1 tbs........Thumb.   · 2 tbs........Golf ball.   · ½ cup.......Half of a fist.   · 1 cup........A fist.   GUIDELINES FOR CHOOSING FOODS  The goal of this diet is to eat a variety of foods and limit calories to 2000 each day. This can be done by choosing foods that are low in calories and fat. The diet also suggests eating small amounts of food often. Doing this helps control your blood glucose levels so they do not get too high or too low. Each meal or snack should contain a protein food source to help you feel more satisfied and to stabilize your blood glucose. Try to eat about the same amount of food around the same time each day. This includes weekend days, travel days, and days off work. Space your meals about 4 to 5 hours apart and add a snack between them if you wish.  For example, a daily food plan could include breakfast, a morning snack, lunch, dinner, and an evening snack. Healthy meals and snacks include whole grains, vegetables, fruits, lean meats, poultry, fish, and dairy products. As you plan your meals, choose a variety of foods. Choose from the bread and starches, vegetables, fruit, dairy, and meat/protein groups. Examples of foods from each group are listed below with their suggested serving sizes. Use measuring cups and spoons to become familiar with what a healthy portion looks like.  Bread and Starches  Each serving equals 15 grams of carbohydrates.  · 1 slice bread.   · ¼ bagel.   · ¾ cup or 1 cup cold cereal (unsweetened).   · ½ cup hot cereal or mashed potatoes.   · 1 small potato (size of a computer mouse).   ·  cup cooked pasta or rice.   · ½ English muffin.   · 1 cup broth-based soup.   · 3 cups popcorn.   · 4 to 6 whole-wheat  crackers.   · ½ cup cooked beans, peas, or corn.   Vegetables  Each serving equals 5 grams of carbohydrates.  · ½ cup cooked vegetables.   · 1 cup raw vegetables.   · ½ cup tomato juice.   Fruit  Each serving equals 15 grams of carbohydrates.  · 1 small apple, banana, or orange.   · 1 ¼ cup watermelon or strawberries.   · ½ cup applesauce (no sugar added).   · 2 tbs raisins.   · ½ banana.   · ½ cup unsweetened canned fruit.   · ½ cup unsweetened fruit juice.   Dairy  Each serving equals 12 to 15 grams of carbohydrates.  · 1 cup fat-free milk.   · 6 oz artificially sweetened yogurt.   · 1 cup buttermilk.   · 1 cup soy milk.   Meat/Protein  · 1 large egg.   · 2 to 3 oz meat, poultry, or fish.   · ½ cup cottage cheese.   · 1 tbs peanut butter.   · ½ cup tofu.   · 1 oz cheese.   · ¼ cup tuna canned in water.   SAMPLE 2000 CALORIE DIET PLAN  Breakfast  · 1 English muffin (2 carb servings).   · Reduced fat cream cheese, 1 tbs.   · 1 scrambled egg.   · ½ grapefruit (1 carb serving).   · Fat-free milk, 1 cup (1 carb serving).   Morning Snack  · Artificially sweetened yogurt, 6 oz (1 carb serving).   · 2 tbs chopped nuts.   · 1 small peach (1 carb serving).   Lunch  · Grilled chicken sandwich.   · 1 hamburger bun (2 carb servings).   · 2 oz chicken breast.   · 1 lettuce leaf.   · 2 slices tomato.   · Reduced fat mayonnaise, 1 tbs.   · Carrot sticks, 1 cup.   · Celery, 1 cup.   · 1 small apple (1 carb serving).   · Fat-free milk, 1 cup (1 carb serving).   Afternoon Snack  · ½ cup low-fat cottage cheese.   · 1 ¼ cups strawberries (1 carb serving).   Dinner  · Steak fajitas.   · 2 oz lean steak.   · 1 whole-wheat tortilla, 8 inches (1 ½ carb servings).   · Shredded lettuce, ¼ cup.   · 2 slices tomato.   · Salsa, ¼ cup.   · Low-fat sour cream, 2 tbs.   · Brown rice,  cup (1 carb serving).   · 1 small orange (1 carb serving).   Evening Snack  · 4 reduced fat whole-wheat crackers (1 carb serving).   · 1 tbs peanut butter.   · 12  to 15 grapes (1 carb serving).   MEAL PLAN  Use this worksheet to help you make a daily meal plan based on the 2000 calorie diabetic diet suggestions. The total amount of carbohydrates in your meal or snack is more important than making sure you include all of the food groups at every meal or snack. If you are using this plan to help you control your blood glucose, you may interchange carbohydrate containing foods (dairy, starches, and fruits). Choose a variety of fresh foods of varying colors and flavors. You can choose from the following foods to build your day's meals:  · 11 Starches.   · 4 Vegetables.   · 3 Fruits.   · 3 Dairy.   · 8 oz Meat.   · Up to 6 Fats.   Your dietician can use this worksheet to help you decide how many servings and what types of foods are right for you.  BREAKFAST  Food Group and Servings / Food Choice  Starches ___________________________________________  Dairy ______________________________________________  Fruit ______________________________________________  Meat ______________________________________________  Fat________________________________________________  LUNCH  Food Group and Servings / Food Choice  Starch _____________________________________________  Meat ______________________________________________  Vegetables _________________________________________  Fruit ______________________________________________  Dairy______________________________________________  Fat________________________________________________  AFTERNOON SNACK  Food Group and Servings / Food Choice  Starch________________________________________________  Meat_________________________________________________  Fruit__________________________________________________  DINNER  Food Group and Servings / Food Choice  Starches ____________________________________________  Meat _______________________________________________  Dairy _______________________________________________  Vegetables  "__________________________________________  Fruit ________________________________________________  Fat_________________________________________________  EVENING SNACK  Food Group and Servings / Food Choice  Fruit _______________________________________________  Meat _______________________________________________  Starch ______________________________________________  DAILY TOTALS  Starches ________________________  Vegetables ______________________  Fruit ___________________________  Dairy ___________________________  Meat ___________________________  Fat _____________________________  Document Released: 07/10/2006 Document Revised: 03/11/2013 Document Reviewed: 07/26/2010  Exit[employer]® Patient Information ©2013 [employer].    Carbohydrate Counting for Diabetes Mellitus, Adult    Carbohydrate counting is a method of keeping track of how many carbohydrates you eat. Eating carbohydrates naturally increases the amount of sugar (glucose) in the blood. Counting how many carbohydrates you eat helps keep your blood glucose within normal limits, which helps you manage your diabetes (diabetes mellitus).  It is important to know how many carbohydrates you can safely have in each meal. This is different for every person. A diet and nutrition specialist (registered dietitian) can help you make a meal plan and calculate how many carbohydrates you should have at each meal and snack.  Carbohydrates are found in the following foods:  · Grains, such as breads and cereals.  · Dried beans and soy products.  · Starchy vegetables, such as potatoes, peas, and corn.  · Fruit and fruit juices.  · Milk and yogurt.  · Sweets and snack foods, such as cake, cookies, candy, chips, and soft drinks.  How do I count carbohydrates?  There are two ways to count carbohydrates in food. You can use either of the methods or a combination of both.  Reading \"Nutrition Facts\" on packaged food  The \"Nutrition Facts\" list is included on the labels of " "almost all packaged foods and beverages in the [place] It includes:  · The serving size.  · Information about nutrients in each serving, including the grams (g) of carbohydrate per serving.  To use the “Nutrition Facts\":  · Decide how many servings you will have.  · Multiply the number of servings by the number of carbohydrates per serving.  · The resulting number is the total amount of carbohydrates that you will be having.  Learning standard serving sizes of other foods  When you eat carbohydrate foods that are not packaged or do not include \"Nutrition Facts\" on the label, you need to measure the servings in order to count the amount of carbohydrates:  · Measure the foods that you will eat with a food scale or measuring cup, if needed.  · Decide how many standard-size servings you will eat.  · Multiply the number of servings by 15. Most carbohydrate-rich foods have about 15 g of carbohydrates per serving.  ? For example, if you eat 8 oz (170 g) of strawberries, you will have eaten 2 servings and 30 g of carbohydrates (2 servings x 15 g = 30 g).  · For foods that have more than one food mixed, such as soups and casseroles, you must count the carbohydrates in each food that is included.  The following list contains standard serving sizes of common carbohydrate-rich foods. Each of these servings has about 15 g of carbohydrates:  · ½ hamburger bun or ½ English muffin.  · ½ oz (15 mL) syrup.  · ½ oz (14 g) jelly.  · 1 slice of bread.  · 1 six-inch tortilla.  · 3 oz (85 g) cooked rice or pasta.  · 4 oz (113 g) cooked dried beans.  · 4 oz (113 g) starchy vegetable, such as peas, corn, or potatoes.  · 4 oz (113 g) hot cereal.  · 4 oz (113 g) mashed potatoes or ¼ of a large baked potato.  · 4 oz (113 g) canned or frozen fruit.  · 4 oz (120 mL) fruit juice.  · 4-6 crackers.  · 6 chicken nuggets.  · 6 oz (170 g) unsweetened dry cereal.  · 6 oz (170 g) plain fat-free yogurt or yogurt sweetened with artificial sweeteners.  · 8 oz " (240 mL) milk.  · 8 oz (170 g) fresh fruit or one small piece of fruit.  · 24 oz (680 g) popped popcorn.  Example of carbohydrate counting  Sample meal  · 3 oz (85 g) chicken breast.  · 6 oz (170 g) brown rice.  · 4 oz (113 g) corn.  · 8 oz (240 mL) milk.  · 8 oz (170 g) strawberries with sugar-free whipped topping.  Carbohydrate calculation  1. Identify the foods that contain carbohydrates:  ? Rice.  ? Corn.  ? Milk.  ? Strawberries.  2. Calculate how many servings you have of each food:  ? 2 servings rice.  ? 1 serving corn.  ? 1 serving milk.  ? 1 serving strawberries.  3. Multiply each number of servings by 15 g:  ? 2 servings rice x 15 g = 30 g.  ? 1 serving corn x 15 g = 15 g.  ? 1 serving milk x 15 g = 15 g.  ? 1 serving strawberries x 15 g = 15 g.  4. Add together all of the amounts to find the total grams of carbohydrates eaten:  ? 30 g + 15 g + 15 g + 15 g = 75 g of carbohydrates total.  Summary  · Carbohydrate counting is a method of keeping track of how many carbohydrates you eat.  · Eating carbohydrates naturally increases the amount of sugar (glucose) in the blood.  · Counting how many carbohydrates you eat helps keep your blood glucose within normal limits, which helps you manage your diabetes.  · A diet and nutrition specialist (registered dietitian) can help you make a meal plan and calculate how many carbohydrates you should have at each meal and snack.  This information is not intended to replace advice given to you by your health care provider. Make sure you discuss any questions you have with your health care provider.  Document Released: 12/18/2006 Document Revised: 07/12/2018 Document Reviewed: 05/31/2017  Elsevier Patient Education © 2020 [employer]

## 2022-06-23 NOTE — ASSESSMENT & PLAN NOTE
New condition currently active  - Continue to eat healthy including cutting out processed carbohydrates and sweets  - Diabetic education included in instructions for this visit  - Keep appointment with diabetic educator June 30  - Metformin increased to 1000 mg daily as patient tolerated 500 mg very well without adverse effects  -ED precautions given and known for worsening or progression of this condition or symptoms such as loss of consciousness, altered mental status, fainting or dizziness

## 2022-06-23 NOTE — ASSESSMENT & PLAN NOTE
New condition currently active  - Recommend healthy diet and exercise as discussed  - We will recheck lipid panel in December 2022

## 2022-06-23 NOTE — ASSESSMENT & PLAN NOTE
New condition currently active  - Recommend supplementation with vitamin D 3 2000 IUs daily  - We will recheck vitamin D level in 6 months

## 2022-06-30 ENCOUNTER — NON-PROVIDER VISIT (OUTPATIENT)
Dept: MEDICAL GROUP | Facility: PHYSICIAN GROUP | Age: 47
End: 2022-06-30
Payer: COMMERCIAL

## 2022-06-30 VITALS — BODY MASS INDEX: 43 KG/M2 | WEIGHT: 315 LBS

## 2022-06-30 PROCEDURE — 99211 OFF/OP EST MAY X REQ PHY/QHP: CPT

## 2022-06-30 ASSESSMENT — FIBROSIS 4 INDEX: FIB4 SCORE: 0.57

## 2022-06-30 NOTE — PROGRESS NOTES
RN-CDE Note    Subjective:     HPI:  Luc Haynes is a 46 y.o. old patient who is seen by the Diabetes Nurse Specialist today for review of his type 2 diabetes, he is new onset of diabetes.      Diabetes Medications:   Metformin ER 1000 mg daily  Taking above medications as prescribed: yes  Taking daily ASA: Not Indicated    Exercise: starting to do a little more exercise.  Walking and riding bike  Diet: has made some dietary changes, eliminating carbohydrates.  Discussed foods that contain carbohydrates, plate method and limiting carbohydrates to no more than 45 gm per meal.   Patient's body mass index is 43 kg/m². Exercise and nutrition counseling were performed at this visit.      Health Maintenance:   Health Maintenance Due   Topic Date Due   • IMM PNEUMOCOCCAL VACCINE: 0-64 Years (1 - PCV) Never done   • RETINAL SCREENING  Never done   • IMM HEP B VACCINE (1 of 3 - Risk 3-dose series) Never done   • IMM DTaP/Tdap/Td Vaccine (2 - Td or Tdap) 04/26/2018         DM:   Last A1c:   Lab Results   Component Value Date/Time    HBA1C 12.5 (A) 06/17/2022 03:30 PM      A1C GOAL: < 7    Glucose monitoring frequency: not testing.  Advised picking up a blood glucose meter at [facility] ([employer]) as it is inexpensive and accurate and testing blood sugars a couple times a week fasting.  Goal is less than 150      Last Retinal Exam: New Diagnosis.     Exam:  Monofilament: current    Lab Results   Component Value Date/Time    MALBCRT see below 06/18/2022 09:01 AM    MICROALBUR <1.2 06/18/2022 09:01 AM        ACR Albumin/Creatinine Ratio goal <30     HTN:   Blood pressure goal <140/<80 .   Currently Rx ACE/ARB: Not Indicated     Dyslipidemia:    Lab Results   Component Value Date/Time    CHOLSTRLTOT 200 (H) 06/18/2022 09:02 AM     (H) 06/18/2022 09:02 AM    HDL 33 (A) 06/18/2022 09:02 AM    TRIGLYCERIDE 208 (H) 06/18/2022 09:02 AM         Currently Rx Statin: No     He  reports that he has never smoked. He has  never used smokeless tobacco.      Plan:     Discussed and educated on:   - All medications, side effects and compliance (discussed carefully)  - Diabetic Meal Plan: appropriate CHO value for meals, foods that contain carbs, looking up CHO values, plate method and reading food labels  - HbA1C: target  - Home glucose monitoring emphasized  - Weight control and daily exercise

## 2022-12-27 DIAGNOSIS — E11.9 TYPE 2 DIABETES MELLITUS WITHOUT COMPLICATION, WITHOUT LONG-TERM CURRENT USE OF INSULIN (HCC): ICD-10-CM

## 2023-01-12 ENCOUNTER — OFFICE VISIT (OUTPATIENT)
Dept: MEDICAL GROUP | Facility: PHYSICIAN GROUP | Age: 48
End: 2023-01-12
Payer: COMMERCIAL

## 2023-01-12 VITALS
DIASTOLIC BLOOD PRESSURE: 70 MMHG | SYSTOLIC BLOOD PRESSURE: 110 MMHG | HEIGHT: 75 IN | HEART RATE: 101 BPM | TEMPERATURE: 98 F | WEIGHT: 315 LBS | BODY MASS INDEX: 39.17 KG/M2 | OXYGEN SATURATION: 93 %

## 2023-01-12 DIAGNOSIS — Z12.12 SCREENING FOR COLORECTAL CANCER: ICD-10-CM

## 2023-01-12 DIAGNOSIS — E55.9 VITAMIN D DEFICIENCY: ICD-10-CM

## 2023-01-12 DIAGNOSIS — E11.9 TYPE 2 DIABETES MELLITUS WITHOUT COMPLICATION, WITHOUT LONG-TERM CURRENT USE OF INSULIN (HCC): Primary | ICD-10-CM

## 2023-01-12 DIAGNOSIS — Z12.11 SCREENING FOR COLORECTAL CANCER: ICD-10-CM

## 2023-01-12 DIAGNOSIS — E78.5 DYSLIPIDEMIA: ICD-10-CM

## 2023-01-12 DIAGNOSIS — Z23 NEED FOR VACCINATION: ICD-10-CM

## 2023-01-12 DIAGNOSIS — E78.2 MIXED HYPERLIPIDEMIA: ICD-10-CM

## 2023-01-12 LAB
HBA1C MFR BLD: 6.5 % (ref 0–5.6)
INT CON NEG: NEGATIVE
INT CON POS: POSITIVE

## 2023-01-12 PROCEDURE — 90471 IMMUNIZATION ADMIN: CPT

## 2023-01-12 PROCEDURE — 83036 HEMOGLOBIN GLYCOSYLATED A1C: CPT

## 2023-01-12 PROCEDURE — 90715 TDAP VACCINE 7 YRS/> IM: CPT

## 2023-01-12 PROCEDURE — 99214 OFFICE O/P EST MOD 30 MIN: CPT | Mod: 25

## 2023-01-12 PROCEDURE — 90472 IMMUNIZATION ADMIN EACH ADD: CPT

## 2023-01-12 PROCEDURE — 90686 IIV4 VACC NO PRSV 0.5 ML IM: CPT

## 2023-01-12 ASSESSMENT — PATIENT HEALTH QUESTIONNAIRE - PHQ9: CLINICAL INTERPRETATION OF PHQ2 SCORE: 0

## 2023-01-12 ASSESSMENT — FIBROSIS 4 INDEX: FIB4 SCORE: 0.58

## 2023-01-12 ASSESSMENT — ENCOUNTER SYMPTOMS
DIZZINESS: 0
VOMITING: 0
BLURRED VISION: 0
COUGH: 0
NAUSEA: 0
HEADACHES: 0
CONSTIPATION: 0
WEIGHT LOSS: 0
SHORTNESS OF BREATH: 0
MYALGIAS: 0
FEVER: 0
DIARRHEA: 0
WEAKNESS: 0
ABDOMINAL PAIN: 0
CHILLS: 0

## 2023-01-13 NOTE — PROGRESS NOTES
Subjective:     CC: diabetes and dylipidemia    HPI:   Luc presents today with    Problem   Dyslipidemia    Chronic condition-not on a statin  -last in June shows the following:   Latest Reference Range & Units 06/18/22 09:02   Cholesterol,Tot 100 - 199 mg/dL 200 (H)   Triglycerides 0 - 149 mg/dL 208 (H)   HDL >=40 mg/dL 33 !   LDL <100 mg/dL 125 (H)        Vitamin D Deficiency    This is a new condition currently active  - Patient's vitamin D level 24  - He does supplement daily with a multivitamin     Bmi 40.0-44.9, Adult ([facility])    This is a activechronic condition.   Max weight: 368   Current weight: 346 (20 lb loss in the past year)  BMI: 43.25  Diet: Greatly improved cutting out processed foods and excessive carbohydrates   Exercise: Walking  Goal Weight: 285  -Patient reports close are fitting better and he has more energy       Type 2 Diabetes Mellitus Without Complication, Without Long-Term Current Use of Insulin ([facility])    Chronic   -A1C today in clinic 6.5%, this is markedly improved since he was first diagnosed with this condition back in June 2022 with A1c of 12.5%.  He has been taking metformin 1000 mg daily and he has drastically changed his diet.  He has reduced starch/sugars.  -Exercise: Walks a bit each day.  -He denies polyuria, polydipsia, polyphagia, or peripheral neuropathy.  -He has seen the diabetic educator and benefited from teaching.         Health Maintenance: Completed    ROS:  Review of Systems   Constitutional:  Negative for chills, fever, malaise/fatigue and weight loss.   Eyes:  Negative for blurred vision.   Respiratory:  Negative for cough and shortness of breath.    Cardiovascular:  Negative for chest pain.   Gastrointestinal:  Negative for abdominal pain, constipation, diarrhea, nausea and vomiting.   Musculoskeletal:  Negative for myalgias.   Neurological:  Negative for dizziness, weakness and headaches.     Objective:     Exam:  /70 (BP Location: Left arm, Patient Position:  "Sitting, BP Cuff Size: Large adult)   Pulse (!) 101   Temp 36.7 °C (98 °F) (Temporal)   Ht 1.905 m (6' 3\")   Wt (!) 157 kg (346 lb)   SpO2 93%   BMI 43.25 kg/m²  Body mass index is 43.25 kg/m².    Physical Exam  Constitutional:       Appearance: Normal appearance.   HENT:      Head: Normocephalic.   Eyes:      Conjunctiva/sclera: Conjunctivae normal.      Pupils: Pupils are equal, round, and reactive to light.   Cardiovascular:      Rate and Rhythm: Normal rate and regular rhythm.      Heart sounds: No murmur heard.  Pulmonary:      Effort: Pulmonary effort is normal. No respiratory distress.      Breath sounds: Normal breath sounds.   Musculoskeletal:         General: Normal range of motion.   Skin:     General: Skin is warm and dry.   Neurological:      General: No focal deficit present.      Mental Status: He is alert and oriented to person, place, and time.   Psychiatric:         Mood and Affect: Mood normal.         Behavior: Behavior normal.       Labs:   No visits with results within 1 Month(s) from this visit.   Latest known visit with results is:   Hospital Outpatient Visit on 06/18/2022   Component Date Value    Creatinine, Urine 06/18/2022 102.38     Microalbumin, Urine Rand* 06/18/2022 <1.2     Micro Alb Creat Ratio 06/18/2022 see below     Color 06/18/2022 Yellow     Character 06/18/2022 Clear     Specific Gravity 06/18/2022 1.033     Ph 06/18/2022 5.0     Glucose 06/18/2022 >=1000 (A)     Ketones 06/18/2022 Trace (A)     Protein 06/18/2022 Negative     Bilirubin 06/18/2022 Negative     Urobilinogen, Urine 06/18/2022 0.2     Nitrite 06/18/2022 Negative     Leukocyte Esterase 06/18/2022 Negative     Occult Blood 06/18/2022 Negative     Micro Urine Req 06/18/2022 see below     25-Hydroxy   Vitamin D 25 06/18/2022 23 (L)     TSH 06/18/2022 1.270     Cholesterol,Tot 06/18/2022 200 (H)     Triglycerides 06/18/2022 208 (H)     HDL 06/18/2022 33 (A)     LDL 06/18/2022 125 (H)     Sodium 06/18/2022 134 " (L)     Potassium 06/18/2022 4.7     Chloride 06/18/2022 99     Co2 06/18/2022 25     Anion Gap 06/18/2022 10.0     Glucose 06/18/2022 287 (H)     Bun 06/18/2022 14     Creatinine 06/18/2022 0.87     Calcium 06/18/2022 9.5     AST(SGOT) 06/18/2022 17     ALT(SGPT) 06/18/2022 29     Alkaline Phosphatase 06/18/2022 105 (H)     Total Bilirubin 06/18/2022 0.4     Albumin 06/18/2022 4.1     Total Protein 06/18/2022 7.1     Globulin 06/18/2022 3.0     A-G Ratio 06/18/2022 1.4     WBC 06/18/2022 5.1     RBC 06/18/2022 5.46     Hemoglobin 06/18/2022 15.3     Hematocrit 06/18/2022 46.4     MCV 06/18/2022 85.0     MCH 06/18/2022 28.0     MCHC 06/18/2022 33.0 (L)     RDW 06/18/2022 39.4     Platelet Count 06/18/2022 256     MPV 06/18/2022 9.5     Fasting Status 06/18/2022 Fasting     GFR (CKD-EPI) 06/18/2022 107          Assessment & Plan: Medical Decision Making     47 y.o. male with the following -     Problem List Items Addressed This Visit       BMI 40.0-44.9, adult (HCC)     Chronic condition stable  - Recommend diligent efforts towards healthy diet and exercise as discussed         Type 2 diabetes mellitus without complication, without long-term current use of insulin (HCC) - Primary     Chronic condition stable  - Recommend diligent efforts towards healthy diet and exercise  - We will continue metformin 1000 mg daily  - We will repeat A1c and CMP at next lab draw         Relevant Orders    POCT Hemoglobin A1C (Completed)    HEMOGLOBIN A1C    Comp Metabolic Panel    Lipid Profile    VITAMIN D,25 HYDROXY (DEFICIENCY)    Vitamin D deficiency     Chronic, uncontrolled  -Taking Vit D 2000 IU daily, will continue daily           Relevant Orders    HEMOGLOBIN A1C    Comp Metabolic Panel    Lipid Profile    VITAMIN D,25 HYDROXY (DEFICIENCY)    Dyslipidemia     Chronic condition of uncertain progression  - Recommend diligent efforts of healthy diet and exercise  - We will repeat lipid panel at next lab draw  -The 10-year ASCVD  risk score (Terry FUENTES, et al., 2019) is: 6%           Relevant Orders    HEMOGLOBIN A1C    Comp Metabolic Panel    Lipid Profile    VITAMIN D,25 HYDROXY (DEFICIENCY)     Other Visit Diagnoses       Screening for colorectal cancer        Relevant Orders    Referral to GI for Colonoscopy    Need for vaccination        Relevant Orders    Influenza Vaccine Quad Injection (PF) (Completed)    Tdap Vaccine =>8YO IM (Completed)    Mixed hyperlipidemia                Differential diagnosis, natural history, supportive care, and indications for immediate follow-up discussed.  Shared decision making approach utilized, and patient is amendable with plan of care.  Patient understands to return to clinic or go to the emergency department if symptoms worsen. All questions and concerns addressed.    Return in about 6 months (around 7/12/2023) for F/U Lab Review, F/U DM.    Please note that this dictation was created using voice recognition software. I have made every reasonable attempt to correct obvious errors, but I expect that there are errors of grammar and possibly content that I did not discover before finalizing the note.

## 2023-01-13 NOTE — ASSESSMENT & PLAN NOTE
Chronic condition stable  - Recommend diligent efforts towards healthy diet and exercise as discussed

## 2023-01-13 NOTE — ASSESSMENT & PLAN NOTE
Chronic condition stable  - Recommend diligent efforts towards healthy diet and exercise  - We will continue metformin 1000 mg daily  - We will repeat A1c and CMP at next lab draw

## 2023-01-13 NOTE — ASSESSMENT & PLAN NOTE
Chronic condition of uncertain progression  - Recommend diligent efforts of healthy diet and exercise  - We will repeat lipid panel at next lab draw  -The 10-year ASCVD risk score (Terry FUENTES, et al., 2019) is: 6%

## 2023-02-03 DIAGNOSIS — R73.01 ELEVATED FASTING GLUCOSE: ICD-10-CM

## 2023-02-03 DIAGNOSIS — E11.9 TYPE 2 DIABETES MELLITUS WITHOUT COMPLICATION, WITHOUT LONG-TERM CURRENT USE OF INSULIN (HCC): ICD-10-CM

## 2023-02-03 RX ORDER — METFORMIN HYDROCHLORIDE EXTENDED-RELEASE TABLETS 1000 MG/1
1000 TABLET, FILM COATED, EXTENDED RELEASE ORAL DAILY
Qty: 90 TABLET | Refills: 3 | Status: SHIPPED | OUTPATIENT
Start: 2023-02-03 | End: 2023-06-26 | Stop reason: SDUPTHER

## 2023-02-13 ENCOUNTER — OFFICE VISIT (OUTPATIENT)
Dept: URGENT CARE | Facility: PHYSICIAN GROUP | Age: 48
End: 2023-02-13
Payer: COMMERCIAL

## 2023-02-13 VITALS
SYSTOLIC BLOOD PRESSURE: 130 MMHG | HEIGHT: 75 IN | HEART RATE: 85 BPM | OXYGEN SATURATION: 95 % | RESPIRATION RATE: 16 BRPM | WEIGHT: 315 LBS | TEMPERATURE: 97.6 F | BODY MASS INDEX: 39.17 KG/M2 | DIASTOLIC BLOOD PRESSURE: 62 MMHG

## 2023-02-13 DIAGNOSIS — H61.23 BILATERAL IMPACTED CERUMEN: ICD-10-CM

## 2023-02-13 DIAGNOSIS — H92.01 OTALGIA OF RIGHT EAR: Primary | ICD-10-CM

## 2023-02-13 DIAGNOSIS — J02.9 SORE THROAT: ICD-10-CM

## 2023-02-13 LAB — S PYO DNA SPEC NAA+PROBE: NOT DETECTED

## 2023-02-13 PROCEDURE — 99214 OFFICE O/P EST MOD 30 MIN: CPT | Performed by: NURSE PRACTITIONER

## 2023-02-13 PROCEDURE — 87651 STREP A DNA AMP PROBE: CPT | Performed by: NURSE PRACTITIONER

## 2023-02-13 ASSESSMENT — FIBROSIS 4 INDEX: FIB4 SCORE: 0.58

## 2023-02-14 NOTE — PROGRESS NOTES
Luc Haynes is a 47 y.o. male who presents for Otalgia ((R) side, pt states pain going down (R) side of throat, x3-4 days )      HPI  This is a new problem. Luc Haynes is a 47 y.o. patient who presents to urgent care with c/o: Right-sided ear pain with pain traveling down the right side of his neck into his throat.  Pain is been there for 3 to 4 days.  He denies fever, cough, runny nose, sinus pain.  He believes he has ear infection.  It is felt this way in the past when he has had an ear infection.  Treatments tried: Over-the-counter analgesics  No other aggravating or alleviating factors.       ROS See HPI    Allergies:     No Known Allergies    PMSFS Hx:  Past Medical History:   Diagnosis Date    Back pain     Chickenpox     Kidney stone     Obesity     Pain     knee     Past Surgical History:   Procedure Laterality Date    KNEE ARTHROSCOPY  4/9/2009    Performed by SYLVIA GONZALEZ at [facility] ORS    MENISCECTOMY, KNEE, MEDIAL  4/9/2009    Performed by SYLVIA GONZALEZ at [facility] ORS    OTHER SURGICAL PROCEDURE  1981    right great toe replacement with skin graft from left calf     Family History   Problem Relation Age of Onset    Cancer Mother         uterine    No Known Problems Father     No Known Problems Sister     No Known Problems Brother     Alzheimer's Disease Maternal Grandmother     Alzheimer's Disease Maternal Grandfather     No Known Problems Son     Sleep Apnea Neg Hx      Social History     Tobacco Use    Smoking status: Never    Smokeless tobacco: Never   Substance Use Topics    Alcohol use: No     Comment: rarely       Problems:   Patient Active Problem List   Diagnosis    ROYER (obstructive sleep apnea)    Elevated fasting glucose    BMI 40.0-44.9, adult ([facility])    Type 2 diabetes mellitus without complication, without long-term current use of insulin ([facility])    Vitamin D deficiency    Dyslipidemia       Medications:   Current Outpatient Medications on File  "Prior to Visit   Medication Sig Dispense Refill    metFORMIN ER 1000 MG TABLET SR 24 HR TAKE 1 TABLET BY MOUTH EVERY DAY 90 Tablet 3    QVAR REDIHALER 80 MCG/ACT inhaler INHALE 1 PUFF IN EACH NOSTRIL DAILY.  USE WITH BABY NIPPLE AS DIRECTED.  MAY USE UP TO BID 10.6 g 8     No current facility-administered medications on file prior to visit.          Objective:     /62   Pulse 85   Temp 36.4 °C (97.6 °F) (Temporal)   Resp 16   Ht 1.905 m (6' 3\")   Wt (!) 159 kg (351 lb 6.4 oz)   SpO2 95%   BMI 43.92 kg/m²     Physical Exam  Vitals and nursing note reviewed.   Constitutional:       General: He is not in acute distress.     Appearance: He is well-developed.   HENT:      Head: Normocephalic.      Right Ear: Hearing, tympanic membrane, ear canal and external ear normal. There is impacted cerumen.      Left Ear: Hearing, tympanic membrane, ear canal and external ear normal. There is impacted cerumen.      Ears:      Comments:   Cerumen Removal Procedure:bilateral   I have discussed the potential risks of this procedure including but not limited to mild discomfort with a sensation of ear fullness and wetness, dizziness, ear canal inflammation, ear canal abrasion with bleeding, and/or ear drum perforation.Patient is aware and consents to the procedure.  Cerumen removed easily with flushing.    Pt tolerated well. No adverse effects.          Mouth/Throat:      Lips: Pink.      Mouth: Mucous membranes are moist.   Eyes:      Pupils: Pupils are equal, round, and reactive to light.   Cardiovascular:      Rate and Rhythm: Normal rate and regular rhythm.      Pulses: Normal pulses.      Heart sounds: Normal heart sounds.   Pulmonary:      Effort: Pulmonary effort is normal.   Musculoskeletal:      Cervical back: Normal range of motion and neck supple.   Lymphadenopathy:      Head:      Right side of head: No tonsillar, preauricular or posterior auricular adenopathy.      Left side of head: No tonsillar, preauricular " or posterior auricular adenopathy.      Cervical: No cervical adenopathy.      Right cervical: No superficial or deep cervical adenopathy.     Left cervical: No superficial or deep cervical adenopathy.   Skin:     General: Skin is warm and dry.      Capillary Refill: Capillary refill takes less than 2 seconds.   Neurological:      Mental Status: He is alert and oriented to person, place, and time.   Psychiatric:         Mood and Affect: Mood normal.         Speech: Speech normal.         Behavior: Behavior normal. Behavior is cooperative.         Thought Content: Thought content normal.       Results for orders placed or performed in visit on 02/13/23   POCT CEPHEID GROUP A STREP - PCR   Result Value Ref Range    POC Group A Strep, PCR Not Detected Not Detected, Invalid       Assessment /Associated Orders:      1. Otalgia of right ear        2. Sore throat  POCT CEPHEID GROUP A STREP - PCR      3. Bilateral impacted cerumen            Medical Decision Making:    Pt is clinically stable at today's acute urgent care visit.  No acute distress noted. Appropriate for outpatient care at this time.   Acute problem today .   Negative strep throat or other bacterial infection today     OTC antihistamine of choice. Follow manufactures dosing and safety guidelines.   OTC fluticasone. Dosage and directions per . Use daily for 10-14 days.   OTC  analgesic of choice (acetaminophen or NSAID) prn pain. Follow manufactures dosing and safety precautions.   Warm compress prn pain   Keep well hydrated    Discussed Dx, management options (risks,benefits, and alternatives to planned treatment), natural progression and supportive care.  Expressed understanding and the treatment plan was agreed upon.   Questions were encouraged and answered   Return to urgent care prn if new or worsening sx or if there is no improvement in condition prn.    Educated in Red flags and indications to immediately call 911 or present to the  Emergency Department.       Time I spent evaluating Luc Haynes in urgent care today was 31  minutes. This time includes preparing for visit, reviewing any pertinent notes or test results, counseling/education, exam, obtaining HPI, interpretation of lab tests, medication management and documentation as indicated above.Time does not include separately billable procedures noted .       Please note that this dictation was created using voice recognition software. I have worked with consultants from the vendor as well as technical experts from [facility] to optimize the interface. I have made every reasonable attempt to correct obvious errors, but I expect that there are errors of grammar and possibly content that I did not discover before finalizing the note.  This note was electronically signed by provider

## 2023-06-19 RX ORDER — BECLOMETHASONE DIPROPIONATE HFA 80 UG/1
AEROSOL, METERED RESPIRATORY (INHALATION)
Qty: 10.6 G | Refills: 0 | Status: SHIPPED | OUTPATIENT
Start: 2023-06-19

## 2023-06-20 ENCOUNTER — TELEPHONE (OUTPATIENT)
Dept: MEDICAL GROUP | Facility: PHYSICIAN GROUP | Age: 48
End: 2023-06-20
Payer: COMMERCIAL

## 2023-06-20 NOTE — TELEPHONE ENCOUNTER
Deshawn Lange is aware and will send it ASAP   PA NEEDED FOR METFORMIN ER 1000MG     PA OR ALTERNATIVE?

## 2023-06-26 DIAGNOSIS — E11.9 TYPE 2 DIABETES MELLITUS WITHOUT COMPLICATION, WITHOUT LONG-TERM CURRENT USE OF INSULIN (HCC): ICD-10-CM

## 2023-06-26 DIAGNOSIS — R73.01 ELEVATED FASTING GLUCOSE: ICD-10-CM

## 2023-06-26 RX ORDER — METFORMIN HYDROCHLORIDE EXTENDED-RELEASE TABLETS 1000 MG/1
1000 TABLET, FILM COATED, EXTENDED RELEASE ORAL DAILY
Qty: 90 TABLET | Refills: 3 | Status: SHIPPED | OUTPATIENT
Start: 2023-06-26